# Patient Record
Sex: FEMALE | Race: OTHER | NOT HISPANIC OR LATINO
[De-identification: names, ages, dates, MRNs, and addresses within clinical notes are randomized per-mention and may not be internally consistent; named-entity substitution may affect disease eponyms.]

---

## 2021-10-28 ENCOUNTER — APPOINTMENT (OUTPATIENT)
Dept: ORTHOPEDIC SURGERY | Facility: CLINIC | Age: 51
End: 2021-10-28

## 2022-01-11 ENCOUNTER — TRANSCRIPTION ENCOUNTER (OUTPATIENT)
Age: 52
End: 2022-01-11

## 2022-01-11 PROBLEM — Z00.00 ENCOUNTER FOR PREVENTIVE HEALTH EXAMINATION: Status: ACTIVE | Noted: 2022-01-11

## 2022-01-11 NOTE — PATIENT PROFILE ADULT - FALL HARM RISK - UNIVERSAL INTERVENTIONS
Bed in lowest position, wheels locked, appropriate side rails in place/Call bell, personal items and telephone in reach/Instruct patient to call for assistance before getting out of bed or chair/Non-slip footwear when patient is out of bed/[place] to call system/Physically safe environment - no spills, clutter or unnecessary equipment/Purposeful Proactive Rounding/Room/bathroom lighting operational, light cord in reach

## 2022-01-12 ENCOUNTER — RESULT REVIEW (OUTPATIENT)
Age: 52
End: 2022-01-12

## 2022-01-12 ENCOUNTER — INPATIENT (INPATIENT)
Facility: HOSPITAL | Age: 52
LOS: 1 days | Discharge: HOME | DRG: 743 | End: 2022-01-14
Attending: OBSTETRICS & GYNECOLOGY | Admitting: OBSTETRICS & GYNECOLOGY
Payer: COMMERCIAL

## 2022-01-12 VITALS
RESPIRATION RATE: 16 BRPM | TEMPERATURE: 98 F | WEIGHT: 156.53 LBS | SYSTOLIC BLOOD PRESSURE: 118 MMHG | OXYGEN SATURATION: 100 % | HEIGHT: 62 IN | HEART RATE: 80 BPM | DIASTOLIC BLOOD PRESSURE: 81 MMHG

## 2022-01-12 DIAGNOSIS — Z98.890 OTHER SPECIFIED POSTPROCEDURAL STATES: Chronic | ICD-10-CM

## 2022-01-12 DIAGNOSIS — Z98.82 BREAST IMPLANT STATUS: Chronic | ICD-10-CM

## 2022-01-12 DIAGNOSIS — Z41.9 ENCOUNTER FOR PROCEDURE FOR PURPOSES OTHER THAN REMEDYING HEALTH STATE, UNSPECIFIED: Chronic | ICD-10-CM

## 2022-01-12 LAB
BLD GP AB SCN SERPL QL: NEGATIVE — SIGNIFICANT CHANGE UP
GLUCOSE BLDC GLUCOMTR-MCNC: 74 MG/DL — SIGNIFICANT CHANGE UP (ref 70–99)
HCT VFR BLD CALC: 39.1 % — SIGNIFICANT CHANGE UP (ref 34.5–45)
HGB BLD-MCNC: 11.8 G/DL — SIGNIFICANT CHANGE UP (ref 11.5–15.5)
MCHC RBC-ENTMCNC: 25.8 PG — LOW (ref 27–34)
MCHC RBC-ENTMCNC: 30.2 GM/DL — LOW (ref 32–36)
MCV RBC AUTO: 85.4 FL — SIGNIFICANT CHANGE UP (ref 80–100)
NRBC # BLD: 0 /100 WBCS — SIGNIFICANT CHANGE UP (ref 0–0)
PLATELET # BLD AUTO: 252 K/UL — SIGNIFICANT CHANGE UP (ref 150–400)
RBC # BLD: 4.58 M/UL — SIGNIFICANT CHANGE UP (ref 3.8–5.2)
RBC # FLD: 19.1 % — HIGH (ref 10.3–14.5)
RH IG SCN BLD-IMP: POSITIVE — SIGNIFICANT CHANGE UP
WBC # BLD: 17.98 K/UL — HIGH (ref 3.8–10.5)
WBC # FLD AUTO: 17.98 K/UL — HIGH (ref 3.8–10.5)

## 2022-01-12 PROCEDURE — 88307 TISSUE EXAM BY PATHOLOGIST: CPT | Mod: 26

## 2022-01-12 PROCEDURE — 88305 TISSUE EXAM BY PATHOLOGIST: CPT | Mod: 26

## 2022-01-12 PROCEDURE — 88302 TISSUE EXAM BY PATHOLOGIST: CPT | Mod: 26

## 2022-01-12 DEVICE — URETERAL CATH OPEN END 5FR 70CM
Type: IMPLANTABLE DEVICE | Status: NON-FUNCTIONAL
Removed: 2022-01-12

## 2022-01-12 DEVICE — GUIDEWIRE SENSOR DUAL-FLEX NITINOL STRAIGHT .035" X 150CM
Type: IMPLANTABLE DEVICE | Status: NON-FUNCTIONAL
Removed: 2022-01-12

## 2022-01-12 DEVICE — SURGICEL POWDER 3 GRAMS
Type: IMPLANTABLE DEVICE | Status: NON-FUNCTIONAL
Removed: 2022-01-12

## 2022-01-12 RX ORDER — SODIUM CHLORIDE 9 MG/ML
1000 INJECTION, SOLUTION INTRAVENOUS
Refills: 0 | Status: DISCONTINUED | OUTPATIENT
Start: 2022-01-12 | End: 2022-01-13

## 2022-01-12 RX ORDER — CELECOXIB 200 MG/1
400 CAPSULE ORAL ONCE
Refills: 0 | Status: COMPLETED | OUTPATIENT
Start: 2022-01-12 | End: 2022-01-12

## 2022-01-12 RX ORDER — ACETAMINOPHEN 500 MG
1000 TABLET ORAL EVERY 6 HOURS
Refills: 0 | Status: DISCONTINUED | OUTPATIENT
Start: 2022-01-12 | End: 2022-01-14

## 2022-01-12 RX ORDER — ONDANSETRON 8 MG/1
8 TABLET, FILM COATED ORAL EVERY 8 HOURS
Refills: 0 | Status: DISCONTINUED | OUTPATIENT
Start: 2022-01-12 | End: 2022-01-14

## 2022-01-12 RX ORDER — HEPARIN SODIUM 5000 [USP'U]/ML
5000 INJECTION INTRAVENOUS; SUBCUTANEOUS ONCE
Refills: 0 | Status: COMPLETED | OUTPATIENT
Start: 2022-01-12 | End: 2022-01-12

## 2022-01-12 RX ORDER — ACETAMINOPHEN 500 MG
1000 TABLET ORAL ONCE
Refills: 0 | Status: COMPLETED | OUTPATIENT
Start: 2022-01-12 | End: 2022-01-12

## 2022-01-12 RX ORDER — HYDROMORPHONE HYDROCHLORIDE 2 MG/ML
0.5 INJECTION INTRAMUSCULAR; INTRAVENOUS; SUBCUTANEOUS
Refills: 0 | Status: DISCONTINUED | OUTPATIENT
Start: 2022-01-12 | End: 2022-01-14

## 2022-01-12 RX ORDER — SIMETHICONE 80 MG/1
80 TABLET, CHEWABLE ORAL EVERY 6 HOURS
Refills: 0 | Status: DISCONTINUED | OUTPATIENT
Start: 2022-01-12 | End: 2022-01-14

## 2022-01-12 RX ORDER — OXYCODONE HYDROCHLORIDE 5 MG/1
5 TABLET ORAL EVERY 4 HOURS
Refills: 0 | Status: DISCONTINUED | OUTPATIENT
Start: 2022-01-12 | End: 2022-01-14

## 2022-01-12 RX ORDER — GABAPENTIN 400 MG/1
600 CAPSULE ORAL ONCE
Refills: 0 | Status: COMPLETED | OUTPATIENT
Start: 2022-01-12 | End: 2022-01-12

## 2022-01-12 RX ORDER — OXYCODONE HYDROCHLORIDE 5 MG/1
10 TABLET ORAL EVERY 6 HOURS
Refills: 0 | Status: DISCONTINUED | OUTPATIENT
Start: 2022-01-12 | End: 2022-01-14

## 2022-01-12 RX ORDER — BUPIVACAINE 13.3 MG/ML
20 INJECTION, SUSPENSION, LIPOSOMAL INFILTRATION ONCE
Refills: 0 | Status: DISCONTINUED | OUTPATIENT
Start: 2022-01-12 | End: 2022-01-14

## 2022-01-12 RX ADMIN — SIMETHICONE 80 MILLIGRAM(S): 80 TABLET, CHEWABLE ORAL at 18:39

## 2022-01-12 RX ADMIN — HYDROMORPHONE HYDROCHLORIDE 0.5 MILLIGRAM(S): 2 INJECTION INTRAMUSCULAR; INTRAVENOUS; SUBCUTANEOUS at 17:25

## 2022-01-12 RX ADMIN — GABAPENTIN 600 MILLIGRAM(S): 400 CAPSULE ORAL at 11:09

## 2022-01-12 RX ADMIN — HYDROMORPHONE HYDROCHLORIDE 0.5 MILLIGRAM(S): 2 INJECTION INTRAMUSCULAR; INTRAVENOUS; SUBCUTANEOUS at 18:31

## 2022-01-12 RX ADMIN — SIMETHICONE 80 MILLIGRAM(S): 80 TABLET, CHEWABLE ORAL at 23:05

## 2022-01-12 RX ADMIN — HEPARIN SODIUM 5000 UNIT(S): 5000 INJECTION INTRAVENOUS; SUBCUTANEOUS at 11:09

## 2022-01-12 RX ADMIN — SODIUM CHLORIDE 125 MILLILITER(S): 9 INJECTION, SOLUTION INTRAVENOUS at 23:13

## 2022-01-12 RX ADMIN — Medication 1000 MILLIGRAM(S): at 21:47

## 2022-01-12 RX ADMIN — Medication 1000 MILLIGRAM(S): at 20:42

## 2022-01-12 RX ADMIN — Medication 1000 MILLIGRAM(S): at 11:08

## 2022-01-12 RX ADMIN — CELECOXIB 400 MILLIGRAM(S): 200 CAPSULE ORAL at 11:08

## 2022-01-12 RX ADMIN — HYDROMORPHONE HYDROCHLORIDE 0.5 MILLIGRAM(S): 2 INJECTION INTRAMUSCULAR; INTRAVENOUS; SUBCUTANEOUS at 17:00

## 2022-01-12 NOTE — H&P ADULT - ASSESSMENT
51 year old P2 who presents for scheduled abdominal hysterectomy for large fibroid uterus.     ERAS protocol  NPO, IVF  Consents preop  Starting Hb 12.8, Cr 0.61  Patient agreeable to [facility]    d/w Dr. Jolanta ROONEY PGY3

## 2022-01-12 NOTE — H&P ADULT - NSHPPHYSICALEXAM_GEN_ALL_CORE
Vital Signs Last 24 Hrs  T(C): 36.7 (12 Jan 2022 10:37), Max: 36.7 (12 Jan 2022 10:37)  T(F): 98.1 (12 Jan 2022 10:37), Max: 98.1 (12 Jan 2022 10:37)  HR: 80 (12 Jan 2022 10:37) (80 - 80)  BP: 118/81 (12 Jan 2022 10:37) (118/81 - 118/81)  RR: 16 (12 Jan 2022 10:37) (16 - 16)  SpO2: 100% (12 Jan 2022 10:37) (100% - 100%)

## 2022-01-12 NOTE — PACU DISCHARGE NOTE - COMMENTS
Report given to Alice RN Dressing to abdomen Dry and intact. Vidal intact, iv site intact, transported to room in bed with O 2 @2LNC.

## 2022-01-12 NOTE — BRIEF OPERATIVE NOTE - OPERATION/FINDINGS
18 week sized mobile uterus on exam under anesthesia. Entry via Pfannenstiel. Extensive lysis of adhesions from anterior abdominal wall to uterus, bladder to anterior uterus. Enlarged fibroid uterus, 18 weeks in size. Bilateral tubal ligation noted, normal appearing remainder of tubes and ovaries bilaterally. 2cm hemorrhagic cyst removed from left ovary, hemostasis achieved with bipolar. Decision made to proceed with supracervical hysterectomy & bilateral salpingectomy, hemostasis achieved with bipolar and Ligasure Impact. Endocervical canal cauterized with bipolar. Cervical stump closed with figure of eight 0 vicryl, hemostasis achieved. Surgicel powder placed over stump and oozy peritoneal beds. Fascia closed with 0 vicryl. Subcutaneous tissue hemostatic and closed with 2.0 vicryl. Skin closed with monocryl. Count correct, patient cleaned and stable.

## 2022-01-12 NOTE — H&P ADULT - HISTORY OF PRESENT ILLNESS
MARI SANTANA  51y  Female [id number]    HPI: Patient is a 51 year old P2, LMP 12/24/21 who presents for scheduled total abdominal hysterectomy for 20 week fibroid uterus causing abnormal uterine bleeding. Patient follows with Dr. Stover ([facility]); no acute complaints today.     POB:  c/s x2 ('97, '00)    PGynHx: AUB, fibroid uterus    PMH: denies    PSH: c/s x2; abdominoplasty 2012; breast augmentation 2012     Meds: denies    All: No Known Allergies    Soc: neg x 3      ROS: neg except as noted in HPI                                   MARI SANTANA  51y  Female [id number]    HPI: Patient is a 51 year old P2, LMP 12/24/21 who presents for scheduled total abdominal hysterectomy for large fibroid uterus causing abnormal uterine bleeding. Patient follows with Dr. Stover ([facility]); no acute complaints today.     POB:  c/s x2 ('97, '00)    PGynHx: AUB, fibroid uterus    PMH: denies    PSH: c/s x2; abdominoplasty 2012; breast augmentation 2012     Meds: denies    All: No Known Allergies    Soc: neg x 3      ROS: neg except as noted in HPI

## 2022-01-12 NOTE — BRIEF OPERATIVE NOTE - NSICDXBRIEFPROCEDURE_GEN_ALL_CORE_FT
PROCEDURES:  Open supracervical hysterectomy 12-Jan-2022 16:42:11  Juve Hi  Salpingectomy, bilateral 12-Jan-2022 16:42:30  Juve Hi  Lysis of pelvic adhesions 12-Jan-2022 16:43:07  Juve Hi  Open ovarian cystectomy 12-Jan-2022 16:43:19  Juve Hi

## 2022-01-13 ENCOUNTER — TRANSCRIPTION ENCOUNTER (OUTPATIENT)
Age: 52
End: 2022-01-13

## 2022-01-13 LAB
HCT VFR BLD CALC: 35 % — SIGNIFICANT CHANGE UP (ref 34.5–45)
HGB BLD-MCNC: 11.1 G/DL — LOW (ref 11.5–15.5)
MCHC RBC-ENTMCNC: 27.1 PG — SIGNIFICANT CHANGE UP (ref 27–34)
MCHC RBC-ENTMCNC: 31.7 GM/DL — LOW (ref 32–36)
MCV RBC AUTO: 85.6 FL — SIGNIFICANT CHANGE UP (ref 80–100)
NRBC # BLD: 0 /100 WBCS — SIGNIFICANT CHANGE UP (ref 0–0)
PLATELET # BLD AUTO: 242 K/UL — SIGNIFICANT CHANGE UP (ref 150–400)
RBC # BLD: 4.09 M/UL — SIGNIFICANT CHANGE UP (ref 3.8–5.2)
RBC # FLD: 18.6 % — HIGH (ref 10.3–14.5)
WBC # BLD: 15.73 K/UL — HIGH (ref 3.8–10.5)
WBC # FLD AUTO: 15.73 K/UL — HIGH (ref 3.8–10.5)

## 2022-01-13 RX ORDER — ENOXAPARIN SODIUM 100 MG/ML
40 INJECTION SUBCUTANEOUS EVERY 24 HOURS
Refills: 0 | Status: DISCONTINUED | OUTPATIENT
Start: 2022-01-13 | End: 2022-01-14

## 2022-01-13 RX ORDER — KETOROLAC TROMETHAMINE 30 MG/ML
30 SYRINGE (ML) INJECTION EVERY 6 HOURS
Refills: 0 | Status: DISCONTINUED | OUTPATIENT
Start: 2022-01-13 | End: 2022-01-14

## 2022-01-13 RX ADMIN — SIMETHICONE 80 MILLIGRAM(S): 80 TABLET, CHEWABLE ORAL at 17:33

## 2022-01-13 RX ADMIN — Medication 30 MILLIGRAM(S): at 09:38

## 2022-01-13 RX ADMIN — Medication 30 MILLIGRAM(S): at 16:18

## 2022-01-13 RX ADMIN — Medication 1000 MILLIGRAM(S): at 07:22

## 2022-01-13 RX ADMIN — Medication 1000 MILLIGRAM(S): at 19:18

## 2022-01-13 RX ADMIN — Medication 1000 MILLIGRAM(S): at 14:51

## 2022-01-13 RX ADMIN — Medication 1000 MILLIGRAM(S): at 06:21

## 2022-01-13 RX ADMIN — ENOXAPARIN SODIUM 40 MILLIGRAM(S): 100 INJECTION SUBCUTANEOUS at 09:12

## 2022-01-13 RX ADMIN — Medication 1000 MILLIGRAM(S): at 19:49

## 2022-01-13 RX ADMIN — Medication 30 MILLIGRAM(S): at 15:55

## 2022-01-13 RX ADMIN — Medication 1000 MILLIGRAM(S): at 01:00

## 2022-01-13 RX ADMIN — Medication 1000 MILLIGRAM(S): at 02:04

## 2022-01-13 RX ADMIN — SIMETHICONE 80 MILLIGRAM(S): 80 TABLET, CHEWABLE ORAL at 23:58

## 2022-01-13 RX ADMIN — SIMETHICONE 80 MILLIGRAM(S): 80 TABLET, CHEWABLE ORAL at 05:45

## 2022-01-13 RX ADMIN — Medication 30 MILLIGRAM(S): at 22:48

## 2022-01-13 RX ADMIN — SIMETHICONE 80 MILLIGRAM(S): 80 TABLET, CHEWABLE ORAL at 11:17

## 2022-01-13 RX ADMIN — Medication 30 MILLIGRAM(S): at 09:13

## 2022-01-13 RX ADMIN — SODIUM CHLORIDE 125 MILLILITER(S): 9 INJECTION, SOLUTION INTRAVENOUS at 06:21

## 2022-01-13 RX ADMIN — Medication 1000 MILLIGRAM(S): at 13:51

## 2022-01-13 RX ADMIN — Medication 30 MILLIGRAM(S): at 21:18

## 2022-01-13 NOTE — DISCHARGE NOTE PROVIDER - HOSPITAL COURSE
Patient is a 51 year old who presented for and underwent an abdominal supracervical hysterectomy, bilateral salpingectomy and left ovarian cystectomy without complication, . POD1 Hb was appropriate for EBL. Vidal was removed and patient was able to void; pain was controlled and she tolerated  a regular diet. On POD2 patient's vitals were stable, she continued to meet all postoperative milestones and was discharged in stable condition.

## 2022-01-13 NOTE — DISCHARGE NOTE PROVIDER - NSDCMRMEDTOKEN_GEN_ALL_CORE_FT
acetaminophen 500 mg oral tablet: 2 tab(s) orally every 6 hours  ibuprofen 600 mg oral tablet: 1 tab(s) orally every 6 hours

## 2022-01-13 NOTE — PROGRESS NOTE ADULT - ATTENDING COMMENTS
pt seen and examined  +flatus ambulation urination  labs reviewed  gen awake alert nad  ab nt nd +bs soft  ext nt  a/p  pod 1 doing well  continue post op care anticipate dc in am

## 2022-01-13 NOTE — DISCHARGE NOTE PROVIDER - NSDCFUADDINST_GEN_ALL_CORE_FT
- Nothing in vagina - no intercourse, tampons, or douching until cleared by your doctor.   - Clean incision by letting warm soapy water run over it during showering. Pat dry with a towel.   - Avoid swimming, tub baths, strenuous activity and heavy lifting until cleared by your doctor.   - Showering is ok.   - Continue oral pain medications as needed for pain.    - Follow up in office in 2 weeks for your postoperative visit.  Call the office to confirm your follow up appointment.   - Call the office sooner if you develop any fever, heavy bleeding, or severe pain.  Go to the closest emergency room for any of these symptoms if you are not able to contact your doctor.

## 2022-01-13 NOTE — DISCHARGE NOTE PROVIDER - CARE PROVIDER_API CALL
Nikki Stover (MD)  Obstetrics and Gynecology  [address], [place] [address]  Phone: [phone]  Fax: [phone]  Follow Up Time:

## 2022-01-14 ENCOUNTER — TRANSCRIPTION ENCOUNTER (OUTPATIENT)
Age: 52
End: 2022-01-14

## 2022-01-14 VITALS
OXYGEN SATURATION: 97 % | RESPIRATION RATE: 16 BRPM | DIASTOLIC BLOOD PRESSURE: 60 MMHG | SYSTOLIC BLOOD PRESSURE: 95 MMHG | TEMPERATURE: 98 F | HEART RATE: 72 BPM

## 2022-01-14 RX ORDER — ACETAMINOPHEN 500 MG
2 TABLET ORAL
Qty: 0 | Refills: 0 | DISCHARGE
Start: 2022-01-14

## 2022-01-14 RX ORDER — IBUPROFEN 200 MG
1 TABLET ORAL
Qty: 0 | Refills: 0 | DISCHARGE
Start: 2022-01-14

## 2022-01-14 RX ORDER — IBUPROFEN 200 MG
600 TABLET ORAL EVERY 6 HOURS
Refills: 0 | Status: DISCONTINUED | OUTPATIENT
Start: 2022-01-14 | End: 2022-01-14

## 2022-01-14 RX ADMIN — Medication 30 MILLIGRAM(S): at 03:26

## 2022-01-14 RX ADMIN — Medication 1000 MILLIGRAM(S): at 13:31

## 2022-01-14 RX ADMIN — Medication 1000 MILLIGRAM(S): at 07:06

## 2022-01-14 RX ADMIN — Medication 1000 MILLIGRAM(S): at 02:00

## 2022-01-14 RX ADMIN — Medication 600 MILLIGRAM(S): at 12:28

## 2022-01-14 RX ADMIN — ENOXAPARIN SODIUM 40 MILLIGRAM(S): 100 INJECTION SUBCUTANEOUS at 07:06

## 2022-01-14 RX ADMIN — Medication 30 MILLIGRAM(S): at 03:56

## 2022-01-14 RX ADMIN — Medication 600 MILLIGRAM(S): at 16:26

## 2022-01-14 RX ADMIN — Medication 1000 MILLIGRAM(S): at 01:01

## 2022-01-14 RX ADMIN — Medication 600 MILLIGRAM(S): at 11:01

## 2022-01-14 RX ADMIN — Medication 600 MILLIGRAM(S): at 17:06

## 2022-01-14 RX ADMIN — OXYCODONE HYDROCHLORIDE 10 MILLIGRAM(S): 5 TABLET ORAL at 07:47

## 2022-01-14 RX ADMIN — SIMETHICONE 80 MILLIGRAM(S): 80 TABLET, CHEWABLE ORAL at 11:01

## 2022-01-14 RX ADMIN — Medication 1000 MILLIGRAM(S): at 07:47

## 2022-01-14 RX ADMIN — SIMETHICONE 80 MILLIGRAM(S): 80 TABLET, CHEWABLE ORAL at 06:09

## 2022-01-14 RX ADMIN — OXYCODONE HYDROCHLORIDE 10 MILLIGRAM(S): 5 TABLET ORAL at 07:06

## 2022-01-14 RX ADMIN — Medication 1000 MILLIGRAM(S): at 12:23

## 2022-01-14 NOTE — DISCHARGE NOTE NURSING/CASE MANAGEMENT/SOCIAL WORK - NSDCPEFALRISK_GEN_ALL_CORE
For information on Fall & Injury Prevention, visit: [email or web address] OR  [email or web address] OR  https://www.cdc.gov/steadi/patient.html

## 2022-01-14 NOTE — DISCHARGE NOTE NURSING/CASE MANAGEMENT/SOCIAL WORK - PATIENT PORTAL LINK FT
You can access the FollowMyHealth Patient Portal offered by [facility] by registering at the following website: [email or web address]. By joining [employer]’s FollowMyHealth portal, you will also be able to view your health information using other applications (apps) compatible with our system.

## 2022-01-14 NOTE — PROGRESS NOTE ADULT - ASSESSMENT
A/P: 51y POD#2  s/p abd Supracervical Hysterectomy, BS, left ovarian cystectomy,  without complication. Patient is stable and doing well.  Patient is meeting all post op milestones.      Neuro: Pain well controlled, continue regimen   CV: hemodynamically stable, monitor vitals  Pulm: saturating well on room air, encourage oob/amb  GI: LFD; IV Heplock   : voiding   Heme: PACU Hb 11.8, POD#1 Hb 11.1  ID: afebrile  Dispo: discharge planning    Juve Hi PGY3  
Patient is a 51 year old P2, s/p  Abd SHUAN, BS, left ovarian cystectomy, TAP block, HUGO for 18 week sized fibroid uterus  and AUB. .    51y Female POD# 0 s/p                                      1. Neuro/Pain:  tylenol/toradol atc, oxycodone PRN  2  CV:  VS per routine  3. Pulm: Encourage ISS  4. GI: AAT  5. :  Vidal in place, TOV in AM  6. Heme: AM CBC  7. ID: --  8. DVT ppx: SCDs  9. Dispo: continue inpatient management
A/P: 51y POD#1 s/p abd Supracervical Hysterectomy, BS, left ovarian cystectomy,  without complication. Patient is stable and doing well.  Patient is meeting all post op milestones.      Neuro: Pain well controlled, continue regimen   CV: hemodynamically stable, monitor vitals  Pulm: saturating well on room air, encourage oob/amb  GI: LFD; heplock IV   : d/c rangel; patient to have TOV   Heme: PACU Hb 11.8, POD#1 Hb 11.1   ID: afebrile  Dispo: continue routine post-op care    Juve Hi PGY3

## 2022-01-14 NOTE — PROGRESS NOTE ADULT - SUBJECTIVE AND OBJECTIVE BOX
GYN Progress Note  POD#1    Patient seen and examined at bedside.  No acute events overnight. No acute complaints.  Pain well controlled.  Has not ambulated yet; tolerating clears.   Has not yet passed flatus.   Vidal is still in place with clear urine.   Denies CP, SOB, N/V, fevers, and chills.    Vital Signs Last 24 Hours  T(C): 37.2 (01-13-22 @ 05:24), Max: 37.2 (01-13-22 @ 05:24)  HR: 81 (01-13-22 @ 05:24) (65 - 86)  BP: 96/60 (01-13-22 @ 05:24) (96/60 - 119/80)  RR: 18 (01-13-22 @ 05:24) (10 - 22)  SpO2: 97% (01-13-22 @ 05:24) (96% - 100%)    I&O's Summary    12 Jan 2022 07:01  -  13 Jan 2022 07:00  --------------------------------------------------------  IN: 1815 mL / OUT: 1785 mL / NET: 30 mL        Physical Exam:  General: NAD  CV: RR, S1, S2  Lungs: CTA b/l, good air flow b/l   Abdomen: Soft, appropriately tender to palpation, softly distended, present bowel sounds in all 4 quadrants   Incision: c/d/i, steris in place, dressing removed   : no bleeding on pad  Ext: warm and well perfused    Labs:                        11.1   15.73 )-----------( 242      ( 13 Jan 2022 05:49 )             35.0   baso x      eos x      imm gran x      lymph x      mono x      poly x                            11.8   17.98 )-----------( 252      ( 12 Jan 2022 17:00 )             39.1   baso x      eos x      imm gran x      lymph x      mono x      poly x          MEDICATIONS  (STANDING):  acetaminophen     Tablet .. 1000 milliGRAM(s) Oral every 6 hours  BUpivacaine liposome 1.3% Injectable (no eMAR) 20 milliLiter(s) Local Injection once  enoxaparin Injectable 40 milliGRAM(s) SubCutaneous every 24 hours  ketorolac   Injectable 30 milliGRAM(s) IV Push every 6 hours  simethicone 80 milliGRAM(s) Chew every 6 hours    MEDICATIONS  (PRN):  HYDROmorphone  Injectable 0.5 milliGRAM(s) IV Push every 15 minutes PRN Severe Pain (7 - 10)  ondansetron Injectable 8 milliGRAM(s) IV Push every 8 hours PRN Nausea and/or Vomiting  oxyCODONE    IR 5 milliGRAM(s) Oral every 4 hours PRN Moderate Pain (4 - 6)  oxyCODONE    IR 10 milliGRAM(s) Oral every 6 hours PRN Severe Pain (7 - 10)      
GYN POST-OPERATIVE CHECK  Patient seen at bedside.  Pain controlled. Tolerating sips.  No OOB yet.  Vidal in place. No flatus yet.    Denies CP, palpitations, SOB, fever, chills, nausea, vomiting.    Vital Signs Last 24 Hrs  T(C): 36.8 (12 Jan 2022 20:48), Max: 36.9 (12 Jan 2022 19:19)  T(F): 98.2 (12 Jan 2022 20:48), Max: 98.4 (12 Jan 2022 19:19)  HR: 72 (12 Jan 2022 20:48) (65 - 86)  BP: 109/76 (12 Jan 2022 20:48) (99/80 - 119/80)  BP(mean): 84 (12 Jan 2022 18:40) (80 - 90)  RR: 18 (12 Jan 2022 20:48) (10 - 22)  SpO2: 98% (12 Jan 2022 20:48) (98% - 100%)    Physical Exam:  Gen: No Acute Distress  Pulm: Clear to auscultation bilaterally  GI: soft, appropriately tender, non-distended, normoactive BS, no rebound, no guarding.  Dressing C/D/I  : Vidal in place  Ext: SCDs in place, no edema/erythema/tenderness    I&O's Summary    12 Jan 2022 07:01  -  12 Jan 2022 21:10  --------------------------------------------------------  IN: 315 mL / OUT: 235 mL / NET: 80 mL      MEDICATIONS  (STANDING):  acetaminophen     Tablet .. 1000 milliGRAM(s) Oral every 6 hours  BUpivacaine liposome 1.3% Injectable (no eMAR) 20 milliLiter(s) Local Injection once  lactated ringers. 1000 milliLiter(s) (125 mL/Hr) IV Continuous <Continuous>  simethicone 80 milliGRAM(s) Chew every 6 hours    MEDICATIONS  (PRN):  HYDROmorphone  Injectable 0.5 milliGRAM(s) IV Push every 15 minutes PRN Severe Pain (7 - 10)  ondansetron Injectable 8 milliGRAM(s) IV Push every 8 hours PRN Nausea and/or Vomiting  oxyCODONE    IR 5 milliGRAM(s) Oral every 4 hours PRN Moderate Pain (4 - 6)  oxyCODONE    IR 10 milliGRAM(s) Oral every 6 hours PRN Severe Pain (7 - 10)    Allergies    No Known Allergies    Intolerances        LABS:                        11.8   17.98 )-----------( 252      ( 12 Jan 2022 17:00 )             39.1                   
GYN Progress Note  POD#2   HD#3    Patient seen and examined at bedside.  No acute events overnight. No acute complaints.  Pain well controlled.  Patient is ambulating and tolerating regular diet.   Patient is passing flatus.    Patient is voiding spontaneously .  Denies CP, SOB, N/V, fevers, and chills, heavy vaginal bleeding.     Vital Signs Last 24 Hours  T(C): 36.7 (01-14-22 @ 05:12), Max: 36.9 (01-13-22 @ 20:51)  HR: 69 (01-14-22 @ 05:12) (63 - 96)  BP: 102/70 (01-14-22 @ 05:12) (94/58 - 107/72)  RR: 17 (01-14-22 @ 05:12) (17 - 18)  SpO2: 96% (01-14-22 @ 05:12) (96% - 97%)    I&O's Summary    13 Jan 2022 07:01  -  14 Jan 2022 07:00  --------------------------------------------------------  IN: 1140 mL / OUT: 1700 mL / NET: -560 mL        Physical Exam:  General: NAD  CV: RR, S1, S2  Lungs: CTA b/l, good air flow b/l   Abdomen: Soft, appropriately tender to palpation, non distended  Incision: incision c/d/i, steris in place  : no bleeding on pad  Ext: warm and well perfused    Labs:                        11.1   15.73 )-----------( 242      ( 13 Jan 2022 05:49 )             35.0   baso x      eos x      imm gran x      lymph x      mono x      poly x                            11.8   17.98 )-----------( 252      ( 12 Jan 2022 17:00 )             39.1   baso x      eos x      imm gran x      lymph x      mono x      poly x          MEDICATIONS  (STANDING):  acetaminophen     Tablet .. 1000 milliGRAM(s) Oral every 6 hours  BUpivacaine liposome 1.3% Injectable (no eMAR) 20 milliLiter(s) Local Injection once  enoxaparin Injectable 40 milliGRAM(s) SubCutaneous every 24 hours  ibuprofen  Tablet. 600 milliGRAM(s) Oral every 6 hours  simethicone 80 milliGRAM(s) Chew every 6 hours    MEDICATIONS  (PRN):  HYDROmorphone  Injectable 0.5 milliGRAM(s) IV Push every 15 minutes PRN Severe Pain (7 - 10)  ondansetron Injectable 8 milliGRAM(s) IV Push every 8 hours PRN Nausea and/or Vomiting  oxyCODONE    IR 5 milliGRAM(s) Oral every 4 hours PRN Moderate Pain (4 - 6)  oxyCODONE    IR 10 milliGRAM(s) Oral every 6 hours PRN Severe Pain (7 - 10)

## 2022-01-19 LAB — SURGICAL PATHOLOGY STUDY: SIGNIFICANT CHANGE UP

## 2022-01-20 DIAGNOSIS — D25.9 LEIOMYOMA OF UTERUS, UNSPECIFIED: ICD-10-CM

## 2022-01-20 DIAGNOSIS — N83.202 UNSPECIFIED OVARIAN CYST, LEFT SIDE: ICD-10-CM

## 2022-01-27 ENCOUNTER — INPATIENT (INPATIENT)
Facility: HOSPITAL | Age: 52
LOS: 3 days | Discharge: HOME | DRG: 863 | End: 2022-01-31
Attending: OBSTETRICS & GYNECOLOGY | Admitting: OBSTETRICS & GYNECOLOGY
Payer: COMMERCIAL

## 2022-01-27 VITALS
RESPIRATION RATE: 18 BRPM | DIASTOLIC BLOOD PRESSURE: 72 MMHG | WEIGHT: 151.9 LBS | SYSTOLIC BLOOD PRESSURE: 109 MMHG | TEMPERATURE: 99 F | HEART RATE: 105 BPM | HEIGHT: 62 IN | OXYGEN SATURATION: 98 %

## 2022-01-27 DIAGNOSIS — Z41.9 ENCOUNTER FOR PROCEDURE FOR PURPOSES OTHER THAN REMEDYING HEALTH STATE, UNSPECIFIED: Chronic | ICD-10-CM

## 2022-01-27 DIAGNOSIS — Z98.82 BREAST IMPLANT STATUS: Chronic | ICD-10-CM

## 2022-01-27 DIAGNOSIS — Z98.890 OTHER SPECIFIED POSTPROCEDURAL STATES: Chronic | ICD-10-CM

## 2022-01-27 LAB
ALBUMIN SERPL ELPH-MCNC: 4.2 G/DL — SIGNIFICANT CHANGE UP (ref 3.3–5)
ALP SERPL-CCNC: 63 U/L — SIGNIFICANT CHANGE UP (ref 40–120)
ALT FLD-CCNC: 15 U/L — SIGNIFICANT CHANGE UP (ref 10–45)
ANION GAP SERPL CALC-SCNC: 12 MMOL/L — SIGNIFICANT CHANGE UP (ref 5–17)
APPEARANCE UR: CLEAR — SIGNIFICANT CHANGE UP
AST SERPL-CCNC: 15 U/L — SIGNIFICANT CHANGE UP (ref 10–40)
BACTERIA # UR AUTO: PRESENT /HPF
BASOPHILS # BLD AUTO: 0.07 K/UL — SIGNIFICANT CHANGE UP (ref 0–0.2)
BASOPHILS NFR BLD AUTO: 0.4 % — SIGNIFICANT CHANGE UP (ref 0–2)
BILIRUB SERPL-MCNC: 0.6 MG/DL — SIGNIFICANT CHANGE UP (ref 0.2–1.2)
BILIRUB UR-MCNC: NEGATIVE — SIGNIFICANT CHANGE UP
BUN SERPL-MCNC: 12 MG/DL — SIGNIFICANT CHANGE UP (ref 7–23)
CALCIUM SERPL-MCNC: 10 MG/DL — SIGNIFICANT CHANGE UP (ref 8.4–10.5)
CHLORIDE SERPL-SCNC: 103 MMOL/L — SIGNIFICANT CHANGE UP (ref 96–108)
CO2 SERPL-SCNC: 25 MMOL/L — SIGNIFICANT CHANGE UP (ref 22–31)
COLOR SPEC: YELLOW — SIGNIFICANT CHANGE UP
CREAT SERPL-MCNC: 0.73 MG/DL — SIGNIFICANT CHANGE UP (ref 0.5–1.3)
DIFF PNL FLD: ABNORMAL
EOSINOPHIL # BLD AUTO: 0.02 K/UL — SIGNIFICANT CHANGE UP (ref 0–0.5)
EOSINOPHIL NFR BLD AUTO: 0.1 % — SIGNIFICANT CHANGE UP (ref 0–6)
EPI CELLS # UR: SIGNIFICANT CHANGE UP /HPF (ref 0–5)
GLUCOSE SERPL-MCNC: 101 MG/DL — HIGH (ref 70–99)
GLUCOSE UR QL: NEGATIVE — SIGNIFICANT CHANGE UP
HCT VFR BLD CALC: 40.6 % — SIGNIFICANT CHANGE UP (ref 34.5–45)
HGB BLD-MCNC: 12.8 G/DL — SIGNIFICANT CHANGE UP (ref 11.5–15.5)
IMM GRANULOCYTES NFR BLD AUTO: 0.4 % — SIGNIFICANT CHANGE UP (ref 0–1.5)
KETONES UR-MCNC: ABNORMAL MG/DL
LACTATE SERPL-SCNC: 0.9 MMOL/L — SIGNIFICANT CHANGE UP (ref 0.5–2)
LEUKOCYTE ESTERASE UR-ACNC: NEGATIVE — SIGNIFICANT CHANGE UP
LYMPHOCYTES # BLD AUTO: 1.65 K/UL — SIGNIFICANT CHANGE UP (ref 1–3.3)
LYMPHOCYTES # BLD AUTO: 8.6 % — LOW (ref 13–44)
MCHC RBC-ENTMCNC: 27.5 PG — SIGNIFICANT CHANGE UP (ref 27–34)
MCHC RBC-ENTMCNC: 31.5 GM/DL — LOW (ref 32–36)
MCV RBC AUTO: 87.1 FL — SIGNIFICANT CHANGE UP (ref 80–100)
MONOCYTES # BLD AUTO: 0.93 K/UL — HIGH (ref 0–0.9)
MONOCYTES NFR BLD AUTO: 4.8 % — SIGNIFICANT CHANGE UP (ref 2–14)
NEUTROPHILS # BLD AUTO: 16.55 K/UL — HIGH (ref 1.8–7.4)
NEUTROPHILS NFR BLD AUTO: 85.7 % — HIGH (ref 43–77)
NITRITE UR-MCNC: NEGATIVE — SIGNIFICANT CHANGE UP
NRBC # BLD: 0 /100 WBCS — SIGNIFICANT CHANGE UP (ref 0–0)
PH UR: 6 — SIGNIFICANT CHANGE UP (ref 5–8)
PLATELET # BLD AUTO: 408 K/UL — HIGH (ref 150–400)
POTASSIUM SERPL-MCNC: 4.2 MMOL/L — SIGNIFICANT CHANGE UP (ref 3.5–5.3)
POTASSIUM SERPL-SCNC: 4.2 MMOL/L — SIGNIFICANT CHANGE UP (ref 3.5–5.3)
PROT SERPL-MCNC: 8 G/DL — SIGNIFICANT CHANGE UP (ref 6–8.3)
PROT UR-MCNC: ABNORMAL MG/DL
RBC # BLD: 4.66 M/UL — SIGNIFICANT CHANGE UP (ref 3.8–5.2)
RBC # FLD: 16.5 % — HIGH (ref 10.3–14.5)
RBC CASTS # UR COMP ASSIST: < 5 /HPF — SIGNIFICANT CHANGE UP
SARS-COV-2 RNA SPEC QL NAA+PROBE: SIGNIFICANT CHANGE UP
SODIUM SERPL-SCNC: 140 MMOL/L — SIGNIFICANT CHANGE UP (ref 135–145)
SP GR SPEC: 1.02 — SIGNIFICANT CHANGE UP (ref 1–1.03)
UROBILINOGEN FLD QL: 0.2 E.U./DL — SIGNIFICANT CHANGE UP
WBC # BLD: 19.29 K/UL — HIGH (ref 3.8–10.5)
WBC # FLD AUTO: 19.29 K/UL — HIGH (ref 3.8–10.5)
WBC UR QL: < 5 /HPF — SIGNIFICANT CHANGE UP

## 2022-01-27 PROCEDURE — 99285 EMERGENCY DEPT VISIT HI MDM: CPT

## 2022-01-27 PROCEDURE — 74177 CT ABD & PELVIS W/CONTRAST: CPT | Mod: 26,MC

## 2022-01-27 RX ORDER — SODIUM CHLORIDE 9 MG/ML
1000 INJECTION INTRAMUSCULAR; INTRAVENOUS; SUBCUTANEOUS ONCE
Refills: 0 | Status: COMPLETED | OUTPATIENT
Start: 2022-01-27 | End: 2022-01-27

## 2022-01-27 RX ORDER — PIPERACILLIN AND TAZOBACTAM 4; .5 G/20ML; G/20ML
3.38 INJECTION, POWDER, LYOPHILIZED, FOR SOLUTION INTRAVENOUS ONCE
Refills: 0 | Status: COMPLETED | OUTPATIENT
Start: 2022-01-27 | End: 2022-01-27

## 2022-01-27 RX ORDER — ACETAMINOPHEN 500 MG
1000 TABLET ORAL EVERY 6 HOURS
Refills: 0 | Status: DISCONTINUED | OUTPATIENT
Start: 2022-01-27 | End: 2022-01-31

## 2022-01-27 RX ORDER — KETOROLAC TROMETHAMINE 30 MG/ML
30 SYRINGE (ML) INJECTION ONCE
Refills: 0 | Status: DISCONTINUED | OUTPATIENT
Start: 2022-01-27 | End: 2022-01-27

## 2022-01-27 RX ORDER — ONDANSETRON 8 MG/1
8 TABLET, FILM COATED ORAL EVERY 8 HOURS
Refills: 0 | Status: DISCONTINUED | OUTPATIENT
Start: 2022-01-27 | End: 2022-01-28

## 2022-01-27 RX ORDER — PIPERACILLIN AND TAZOBACTAM 4; .5 G/20ML; G/20ML
3.38 INJECTION, POWDER, LYOPHILIZED, FOR SOLUTION INTRAVENOUS EVERY 6 HOURS
Refills: 0 | Status: DISCONTINUED | OUTPATIENT
Start: 2022-01-28 | End: 2022-01-31

## 2022-01-27 RX ORDER — ENOXAPARIN SODIUM 100 MG/ML
40 INJECTION SUBCUTANEOUS EVERY 24 HOURS
Refills: 0 | Status: DISCONTINUED | OUTPATIENT
Start: 2022-01-27 | End: 2022-01-31

## 2022-01-27 RX ADMIN — SODIUM CHLORIDE 1000 MILLILITER(S): 9 INJECTION INTRAMUSCULAR; INTRAVENOUS; SUBCUTANEOUS at 18:40

## 2022-01-27 RX ADMIN — PIPERACILLIN AND TAZOBACTAM 200 GRAM(S): 4; .5 INJECTION, POWDER, LYOPHILIZED, FOR SOLUTION INTRAVENOUS at 23:22

## 2022-01-27 RX ADMIN — Medication 30 MILLIGRAM(S): at 21:27

## 2022-01-27 RX ADMIN — SODIUM CHLORIDE 1000 MILLILITER(S): 9 INJECTION INTRAMUSCULAR; INTRAVENOUS; SUBCUTANEOUS at 20:37

## 2022-01-27 RX ADMIN — Medication 30 MILLIGRAM(S): at 20:37

## 2022-01-27 NOTE — ED ADULT NURSE NOTE - NSICDXPASTSURGICALHX_GEN_ALL_CORE_FT
PAST SURGICAL HISTORY:   delivery delivered x 2    Elective surgery right foot    H/O abdominoplasty     H/O bilateral breast implants

## 2022-01-27 NOTE — ED PROVIDER NOTE - OBJECTIVE STATEMENT
52 y/o F, recently with total abdominal hysterectomy and b/l salpingectomy, L ovarian cystectomy on Jan 12th. Patient here now as she has had a fever as high as 104 for the past day, took Tylenol, Motrin, today went down but feeling weak. No other symptoms. Dr Stover, her surgeon, told her to come here for evaluation. Vaccinated for COVID only x1 in Nov 2021. No SOB, cough, chest pain, dysuria, vomiting, diarrhea. Pain improving since surgery, no discharge, no vaginal bleeding.

## 2022-01-27 NOTE — ED ADULT NURSE REASSESSMENT NOTE - NS ED NURSE REASSESS COMMENT FT1
Patient a/oX3, c/o of headache, no dizziness, with episodes of fever.  Vital signs stable.  All labs sent.  Toradol 30mg IVP adminsitered for pain.  Disposition pending.

## 2022-01-27 NOTE — ED PROVIDER NOTE - CLINICAL SUMMARY MEDICAL DECISION MAKING FREE TEXT BOX
50 y/o F with recent abdominal hysterectomy, bl salpingectomy, L ovarian cystectomy, now w/ fever to 104 x1d, resolved with Tylenol and Motrin but feeling weak. Plan for sepsis labs due to fever and tachycardia, blood cultures, UA, consult GYN. 52 y/o F with recent abdominal hysterectomy, bl salpingectomy, L ovarian cystectomy, now w/ fever to 104 x1d, resolved with Tylenol and Motrin but feeling weak. Plan for sepsis labs due to fever and tachycardia, blood cultures, UA, consult GYN.    CT with + abd wall abscess - signed out to night team pending gyn dispo.

## 2022-01-27 NOTE — ED ADULT TRIAGE NOTE - CHIEF COMPLAINT QUOTE
Patient states had hysterectomy 2 weeks ago 1/12/2022, and last night had headache and today fever of 104o, took Tylenol at 2pm today.  Told by OB Dr. Bowers to go to ED.

## 2022-01-27 NOTE — ED ADULT NURSE NOTE - OBJECTIVE STATEMENT
Patient states had hysterectomy 2 weeks ago 1/12/2022 due to uterine fiboids, and last night had headache and today fever of 104o, took Tylenol at 2pm today.  Told by OB Dr. Bowers to go to ED.

## 2022-01-27 NOTE — ED PROVIDER NOTE - CONSTITUTIONAL, MLM
normal... Mildly weak appearing, awake, alert, oriented to person, place, time/situation and in no apparent distress.

## 2022-01-28 ENCOUNTER — TRANSCRIPTION ENCOUNTER (OUTPATIENT)
Age: 52
End: 2022-01-28

## 2022-01-28 PROBLEM — N92.0 EXCESSIVE AND FREQUENT MENSTRUATION WITH REGULAR CYCLE: Chronic | Status: ACTIVE | Noted: 2022-01-11

## 2022-01-28 PROBLEM — D64.9 ANEMIA, UNSPECIFIED: Chronic | Status: ACTIVE | Noted: 2022-01-11

## 2022-01-28 LAB
ANION GAP SERPL CALC-SCNC: 11 MMOL/L — SIGNIFICANT CHANGE UP (ref 5–17)
BASOPHILS # BLD AUTO: 0.07 K/UL — SIGNIFICANT CHANGE UP (ref 0–0.2)
BASOPHILS NFR BLD AUTO: 0.4 % — SIGNIFICANT CHANGE UP (ref 0–2)
BUN SERPL-MCNC: 11 MG/DL — SIGNIFICANT CHANGE UP (ref 7–23)
CALCIUM SERPL-MCNC: 9.6 MG/DL — SIGNIFICANT CHANGE UP (ref 8.4–10.5)
CHLORIDE SERPL-SCNC: 105 MMOL/L — SIGNIFICANT CHANGE UP (ref 96–108)
CO2 SERPL-SCNC: 22 MMOL/L — SIGNIFICANT CHANGE UP (ref 22–31)
CREAT SERPL-MCNC: 0.78 MG/DL — SIGNIFICANT CHANGE UP (ref 0.5–1.3)
EOSINOPHIL # BLD AUTO: 0.04 K/UL — SIGNIFICANT CHANGE UP (ref 0–0.5)
EOSINOPHIL NFR BLD AUTO: 0.2 % — SIGNIFICANT CHANGE UP (ref 0–6)
GLUCOSE SERPL-MCNC: 88 MG/DL — SIGNIFICANT CHANGE UP (ref 70–99)
HCT VFR BLD CALC: 39 % — SIGNIFICANT CHANGE UP (ref 34.5–45)
HGB BLD-MCNC: 11.8 G/DL — SIGNIFICANT CHANGE UP (ref 11.5–15.5)
IMM GRANULOCYTES NFR BLD AUTO: 0.7 % — SIGNIFICANT CHANGE UP (ref 0–1.5)
LDH SERPL L TO P-CCNC: 214 U/L — SIGNIFICANT CHANGE UP (ref 50–242)
LYMPHOCYTES # BLD AUTO: 1.61 K/UL — SIGNIFICANT CHANGE UP (ref 1–3.3)
LYMPHOCYTES # BLD AUTO: 9.2 % — LOW (ref 13–44)
MAGNESIUM SERPL-MCNC: 2 MG/DL — SIGNIFICANT CHANGE UP (ref 1.6–2.6)
MCHC RBC-ENTMCNC: 26.6 PG — LOW (ref 27–34)
MCHC RBC-ENTMCNC: 30.3 GM/DL — LOW (ref 32–36)
MCV RBC AUTO: 88 FL — SIGNIFICANT CHANGE UP (ref 80–100)
MONOCYTES # BLD AUTO: 1.19 K/UL — HIGH (ref 0–0.9)
MONOCYTES NFR BLD AUTO: 6.8 % — SIGNIFICANT CHANGE UP (ref 2–14)
NEUTROPHILS # BLD AUTO: 14.39 K/UL — HIGH (ref 1.8–7.4)
NEUTROPHILS NFR BLD AUTO: 82.7 % — HIGH (ref 43–77)
NRBC # BLD: 0 /100 WBCS — SIGNIFICANT CHANGE UP (ref 0–0)
PHOSPHATE SERPL-MCNC: 3 MG/DL — SIGNIFICANT CHANGE UP (ref 2.5–4.5)
PLATELET # BLD AUTO: 333 K/UL — SIGNIFICANT CHANGE UP (ref 150–400)
POTASSIUM SERPL-MCNC: 4.1 MMOL/L — SIGNIFICANT CHANGE UP (ref 3.5–5.3)
POTASSIUM SERPL-SCNC: 4.1 MMOL/L — SIGNIFICANT CHANGE UP (ref 3.5–5.3)
RBC # BLD: 4.43 M/UL — SIGNIFICANT CHANGE UP (ref 3.8–5.2)
RBC # FLD: 16.4 % — HIGH (ref 10.3–14.5)
SODIUM SERPL-SCNC: 138 MMOL/L — SIGNIFICANT CHANGE UP (ref 135–145)
WBC # BLD: 17.43 K/UL — HIGH (ref 3.8–10.5)
WBC # FLD AUTO: 17.43 K/UL — HIGH (ref 3.8–10.5)

## 2022-01-28 RX ORDER — IBUPROFEN 200 MG
600 TABLET ORAL EVERY 6 HOURS
Refills: 0 | Status: DISCONTINUED | OUTPATIENT
Start: 2022-01-28 | End: 2022-01-31

## 2022-01-28 RX ORDER — IBUPROFEN 200 MG
600 TABLET ORAL EVERY 6 HOURS
Refills: 0 | Status: DISCONTINUED | OUTPATIENT
Start: 2022-01-28 | End: 2022-01-28

## 2022-01-28 RX ORDER — SODIUM CHLORIDE 9 MG/ML
1000 INJECTION, SOLUTION INTRAVENOUS
Refills: 0 | Status: DISCONTINUED | OUTPATIENT
Start: 2022-01-28 | End: 2022-01-30

## 2022-01-28 RX ORDER — ONDANSETRON 8 MG/1
8 TABLET, FILM COATED ORAL EVERY 6 HOURS
Refills: 0 | Status: DISCONTINUED | OUTPATIENT
Start: 2022-01-28 | End: 2022-01-31

## 2022-01-28 RX ADMIN — Medication 1000 MILLIGRAM(S): at 06:39

## 2022-01-28 RX ADMIN — ENOXAPARIN SODIUM 40 MILLIGRAM(S): 100 INJECTION SUBCUTANEOUS at 00:58

## 2022-01-28 RX ADMIN — PIPERACILLIN AND TAZOBACTAM 200 GRAM(S): 4; .5 INJECTION, POWDER, LYOPHILIZED, FOR SOLUTION INTRAVENOUS at 05:16

## 2022-01-28 RX ADMIN — Medication 600 MILLIGRAM(S): at 12:18

## 2022-01-28 RX ADMIN — PIPERACILLIN AND TAZOBACTAM 200 GRAM(S): 4; .5 INJECTION, POWDER, LYOPHILIZED, FOR SOLUTION INTRAVENOUS at 17:35

## 2022-01-28 RX ADMIN — Medication 1000 MILLIGRAM(S): at 00:58

## 2022-01-28 RX ADMIN — SODIUM CHLORIDE 125 MILLILITER(S): 9 INJECTION, SOLUTION INTRAVENOUS at 07:47

## 2022-01-28 RX ADMIN — SODIUM CHLORIDE 125 MILLILITER(S): 9 INJECTION, SOLUTION INTRAVENOUS at 14:55

## 2022-01-28 RX ADMIN — Medication 1000 MILLIGRAM(S): at 16:29

## 2022-01-28 RX ADMIN — Medication 1000 MILLIGRAM(S): at 21:29

## 2022-01-28 RX ADMIN — ENOXAPARIN SODIUM 40 MILLIGRAM(S): 100 INJECTION SUBCUTANEOUS at 23:11

## 2022-01-28 RX ADMIN — Medication 1000 MILLIGRAM(S): at 22:00

## 2022-01-28 RX ADMIN — Medication 1000 MILLIGRAM(S): at 02:00

## 2022-01-28 RX ADMIN — PIPERACILLIN AND TAZOBACTAM 200 GRAM(S): 4; .5 INJECTION, POWDER, LYOPHILIZED, FOR SOLUTION INTRAVENOUS at 23:11

## 2022-01-28 RX ADMIN — PIPERACILLIN AND TAZOBACTAM 200 GRAM(S): 4; .5 INJECTION, POWDER, LYOPHILIZED, FOR SOLUTION INTRAVENOUS at 11:18

## 2022-01-28 RX ADMIN — Medication 1000 MILLIGRAM(S): at 15:28

## 2022-01-28 RX ADMIN — Medication 1000 MILLIGRAM(S): at 07:40

## 2022-01-28 RX ADMIN — Medication 600 MILLIGRAM(S): at 11:18

## 2022-01-28 RX ADMIN — SODIUM CHLORIDE 125 MILLILITER(S): 9 INJECTION, SOLUTION INTRAVENOUS at 22:49

## 2022-01-28 NOTE — DISCHARGE NOTE PROVIDER - NSDCFUADDAPPT_GEN_ALL_CORE_FT
Dr. Marcus, which Infectious Disease, will schedule an appointment for you to see him in the office next week. ([phone])

## 2022-01-28 NOTE — DISCHARGE NOTE PROVIDER - PROVIDER TOKENS
PROVIDER:[TOKEN:[[id number]]] PROVIDER:[TOKEN:[[id number]]],PROVIDER:[TOKEN:[[id number]]]

## 2022-01-28 NOTE — H&P ADULT - HISTORY OF PRESENT ILLNESS
Patient is a 51 year old P2 who is POD 15 s/p  ANGELIC, BS, R cystectomy (hemorrhagic cyst) for large fibroid uterus causing abnormal uterine bleeding with Dr. Stover on . Patient had an unremarkable post op course while in house and was discharged POD2 after meeting all postop milestones. Starting yesterday patient reported a diffuse HA rated 10/10 for which she took tylenol and motrin, with minimal improvement. HA now rated 7/10. She also experienced subjective fevers and chills, and took her temp at home which was 104 deg F at 3pm. Denies any coughs, SOB, dysuria, urinary frequency or urgency, abdominal pain, calf pain, lightheadedness/dizziness, CP, abnormal vaginal discharge or bleeding.    POB:  c/s x2 ('97, '00)    PGynHx: AUB, fibroid uterus s/p ANGELIC, BS, right cystectomy as per HPI    PMH: denies    PSH: c/s x2; abdominoplasty ; breast augmentation , ANGELIC, BS, right cystectomy 21    Meds: tylenol and ibuprofen prn for post op pain    All: No Known Allergies    Soc: neg x 3    Vital Signs Last 24 Hrs  T(C): 38.2 (2022 01:11), Max: 38.2 (2022 01:11)  T(F): 100.8 (2022 01:11), Max: 100.8 (2022 01:11)  HR: 99 (2022 01:11) (99 - 113)  BP: 125/81 (2022 00:41) (109/72 - 125/81)  BP(mean): --  RR: 16 (2022 01:11) (16 - 18)  SpO2: 96% (2022 01:11) (95% - 98%)    Physical Exam:  Gen: NAD  GI: soft, nontender, nondistended + BS, no rebound no guarding. Pfannenstiel skin incision noted to be erythematous with induration in the midline, minimal fluctuance towards the mons, no pain to palpation, intact without drainage.  Ext: no edema, erythema, tenderness     LABS:                        12.8   19.29 )-----------( 408      ( 2022 18:43 )             40.6         140  |  103  |  12  ----------------------------<  101<H>  4.2   |  25  |  0.73    Ca    10.0      2022 18:43    TPro  8.0  /  Alb  4.2  /  TBili  0.6  /  DBili  x   /  AST  15  /  ALT  15  /  AlkPhos  63        Urinalysis Basic - ( 2022 18:36 )    Color: Yellow / Appearance: Clear / S.020 / pH: x  Gluc: x / Ketone: Trace mg/dL  / Bili: Negative / Urobili: 0.2 E.U./dL   Blood: x / Protein: Trace mg/dL / Nitrite: NEGATIVE   Leuk Esterase: NEGATIVE / RBC: < 5 /HPF / WBC < 5 /HPF   Sq Epi: x / Non Sq Epi: 0-5 /HPF / Bacteria: Present /HPF        RADIOLOGY & ADDITIONAL STUDIES:    ACC: [id number] EXAM:  CT ABDOMEN AND PELVIS IC                          PROCEDURE DATE:  2022          INTERPRETATION:  CT SCAN OF ABDOMEN AND PELVIS    History: Fever, recent hysterectomy, bilateral salpingectomy, left   ovarian cystectomy. Rule out collection/abscess.    Technique: CT scan of abdomen and pelvis was performed from lung bases   through symphysis pubis. Intravenous contrast material was utilized. Oral   contrast not utilized. Axial, sagittal and coronal reformatted images   were reviewed.    Comparison: None.    Findings:    Lower chest: Mild centrilobular emphysematous changes. Linear atelectasis   bilateral lung bases. Partially visualized bilateral breast implants.    Liver:  Normal.    Gallbladder: No radiopaque stones gallbladder.    Spleen:  Normal.    Pancreas:  Normal.    Adrenal glands:  Normal.    Kidneys: No renal stones or hydronephrosis bilaterally.    Adenopathy:  Mildly enlarged bilateral external iliac nodes, measuring up   to 1.2 cm on the right. These are probably reactive.    Ascites: None.    Gastrointestinal tract/peritoneum: Limited evaluation without the use of   enteric contrast. Normal appendix. Large stool throughout the colon. No   obstruction or free air.    Vessels: Normal.    Pelvicorgans: Post supracervical hysterectomy. Normal bladder. No large   adnexal masses. Trace pelvic fluid.    Soft tissues: Extensive subcutaneous fat stranding of the lower abdominal   wall, likely post surgical. There is a 5.7 x 0.9 x 3.1 cm (CC x AP x TR)   partially organized fluid at the level of the incision site between the   abdominal wall fascia and inferior rectus abdominis musculature with   subtle rim enhancement.. Post abdominoplasty. Punctate foci subcutaneous   air left abdominal wall.    Bones: Mild degenerative changes of the spine.    Impression:  1.  Post supracervical hysterectomy. No discrete abdominal or pelvic   fluid collection.  2.  5.7 cm partially organized rim-enhancing fluid at the level of the   incision site between the lower rectus abdominis musculature and   abdominal wall fascia, probable for phlegmon versus early abscess.    I, Adin Banerjee MD, have reviewed the images and the report and agree   with the findings, with the following modification: Also noted is a small   focus of pneumoperitoneum in the pelvis on the left adjacent to the   sigmoid colon most likely related to recent surgery.    --- End of Report ---          ANDRA ZAMORA MD; Resident Radiologist  This document has been electronically signed.  ADIN BANERJEE MD; Attending Radiologist  This document has been electronically signed. 2022  9:34PM

## 2022-01-28 NOTE — DISCHARGE NOTE PROVIDER - CARE PROVIDERS DIRECT ADDRESSES
,DirectAddress_Unknown ,DirectAddress_Unknown,[email or web address]

## 2022-01-28 NOTE — DISCHARGE NOTE PROVIDER - CARE PROVIDER_API CALL
Nikki Stover (MD)  Obstetrics and Gynecology  [address], [place] [address]  Phone: [phone]  Fax: [phone]  Follow Up Time:    Nikki Stover)  Obstetrics and Gynecology  [address], [place] [address]  Phone: [phone]  Fax: [phone]  Follow Up Time:     Alfred Marcus)  Internal Medicine  [address] [place] [address]  Phone: [phone]  Fax: [phone]  Follow Up Time:

## 2022-01-28 NOTE — DISCHARGE NOTE PROVIDER - HOSPITAL COURSE
50yo patient POD15 from a ANGELIC, BS, R ovarian cystectomy presented to the emergency room for fevers and chills.  She was found to be febrile and tachycardic.  CT imaging noted a suspected 5cm abscess.   She was admitted for IV antibiotics (zosyn), cultures, trending labs, hydration. 50yo patient POD15 from a ANGELIC, BS, R ovarian cystectomy presented to the emergency room for fevers and chills.  She was found to be febrile and tachycardic.  CT imaging noted a suspected 5cm abscess.   She was admitted for IV antibiotics (zosyn and vancomycin) and cultures. Wound culture grew MSSA. Infectious disease consulted. Pt afebrile for 48hours and normalized WBC count. Per ID recommendations, pt sent home on PO keflex until 2/11 with outpatient ID follow up.

## 2022-01-28 NOTE — DISCHARGE NOTE PROVIDER - NSDCMRMEDTOKEN_GEN_ALL_CORE_FT
acetaminophen 500 mg oral tablet: 2 tab(s) orally every 6 hours   acetaminophen 500 mg oral tablet: 2 tab(s) orally every 6 hours  cephalexin 500 mg oral tablet: 1 tab(s) orally every 6 hours

## 2022-01-28 NOTE — DISCHARGE NOTE PROVIDER - NSDCFUADDINST_GEN_ALL_CORE_FT
- Nothing in vagina - no intercourse, tampons, or douching until cleared by your doctor.   - Avoid swimming, tub baths, strenuous activity and heavy lifting until cleared by your doctor.   - Showering is ok.   - Continue oral pain medications as needed for pain.    - Follow up in office in 1-2 weeks.  Call the office to confirm your follow up appointment.   - Call the office sooner if you develop severe pain, heavy vaginal bleeding greater than 2 pads in 2 hours, or fevers greater than 100.4 F. Go to the closest emergency room for any of these symptoms if you are not able to contact your doctor. Please continue to take the Keflex 500mg every 6 hours until 2/11      - Nothing in vagina - no intercourse, tampons, or douching until cleared by your doctor.   - Avoid swimming, tub baths, strenuous activity and heavy lifting until cleared by your doctor.   - Showering is ok.   - Continue oral pain medications as needed for pain.    - Follow up in office in 1-2 weeks.  Call the office to confirm your follow up appointment.   - Call the office sooner if you develop severe pain, heavy vaginal bleeding greater than 2 pads in 2 hours, or fevers greater than 100.4 F. Go to the closest emergency room for any of these symptoms if you are not able to contact your doctor.

## 2022-01-28 NOTE — H&P ADULT - ASSESSMENT
Patient is a 51 year old P2 who is POD 15 s/p ANGELIC, BS, R cystectomy (hemorrhagic cyst) for large fibroid uterus causing abnormal uterine bleeding with Dr. Stover on 1/12. Admitted for Post op fever, CT suspicious for intra-abdominal abscess.  Neuro: Tylenol, prn  Cards: euvolemic   Resp: RA  FEN/GI: diet, PPI, zofran prn  : voiding  ID: intra-abdominal abscess noted on CT A/P. TLast 100.8 at about 1:45am. f/u blood and urine Clx, Zosyn 3.375 (1/27-)  PPX: SCDs, lovenox

## 2022-01-29 LAB
ANION GAP SERPL CALC-SCNC: 12 MMOL/L — SIGNIFICANT CHANGE UP (ref 5–17)
BASOPHILS # BLD AUTO: 0.04 K/UL — SIGNIFICANT CHANGE UP (ref 0–0.2)
BASOPHILS NFR BLD AUTO: 0.3 % — SIGNIFICANT CHANGE UP (ref 0–2)
BUN SERPL-MCNC: 8 MG/DL — SIGNIFICANT CHANGE UP (ref 7–23)
CALCIUM SERPL-MCNC: 8.7 MG/DL — SIGNIFICANT CHANGE UP (ref 8.4–10.5)
CHLORIDE SERPL-SCNC: 106 MMOL/L — SIGNIFICANT CHANGE UP (ref 96–108)
CO2 SERPL-SCNC: 21 MMOL/L — LOW (ref 22–31)
CREAT SERPL-MCNC: 0.68 MG/DL — SIGNIFICANT CHANGE UP (ref 0.5–1.3)
CULTURE RESULTS: SIGNIFICANT CHANGE UP
EOSINOPHIL # BLD AUTO: 0.06 K/UL — SIGNIFICANT CHANGE UP (ref 0–0.5)
EOSINOPHIL NFR BLD AUTO: 0.4 % — SIGNIFICANT CHANGE UP (ref 0–6)
GLUCOSE SERPL-MCNC: 88 MG/DL — SIGNIFICANT CHANGE UP (ref 70–99)
HCT VFR BLD CALC: 32.4 % — LOW (ref 34.5–45)
HGB BLD-MCNC: 10.4 G/DL — LOW (ref 11.5–15.5)
IMM GRANULOCYTES NFR BLD AUTO: 0.5 % — SIGNIFICANT CHANGE UP (ref 0–1.5)
LDH SERPL L TO P-CCNC: 115 U/L — SIGNIFICANT CHANGE UP (ref 50–242)
LYMPHOCYTES # BLD AUTO: 1.87 K/UL — SIGNIFICANT CHANGE UP (ref 1–3.3)
LYMPHOCYTES # BLD AUTO: 12.5 % — LOW (ref 13–44)
MAGNESIUM SERPL-MCNC: 1.7 MG/DL — SIGNIFICANT CHANGE UP (ref 1.6–2.6)
MCHC RBC-ENTMCNC: 27.4 PG — SIGNIFICANT CHANGE UP (ref 27–34)
MCHC RBC-ENTMCNC: 32.1 GM/DL — SIGNIFICANT CHANGE UP (ref 32–36)
MCV RBC AUTO: 85.3 FL — SIGNIFICANT CHANGE UP (ref 80–100)
MONOCYTES # BLD AUTO: 0.99 K/UL — HIGH (ref 0–0.9)
MONOCYTES NFR BLD AUTO: 6.6 % — SIGNIFICANT CHANGE UP (ref 2–14)
NEUTROPHILS # BLD AUTO: 11.95 K/UL — HIGH (ref 1.8–7.4)
NEUTROPHILS NFR BLD AUTO: 79.7 % — HIGH (ref 43–77)
NRBC # BLD: 0 /100 WBCS — SIGNIFICANT CHANGE UP (ref 0–0)
PHOSPHATE SERPL-MCNC: 3.1 MG/DL — SIGNIFICANT CHANGE UP (ref 2.5–4.5)
PLATELET # BLD AUTO: 320 K/UL — SIGNIFICANT CHANGE UP (ref 150–400)
POTASSIUM SERPL-MCNC: 3.5 MMOL/L — SIGNIFICANT CHANGE UP (ref 3.5–5.3)
POTASSIUM SERPL-SCNC: 3.5 MMOL/L — SIGNIFICANT CHANGE UP (ref 3.5–5.3)
RBC # BLD: 3.8 M/UL — SIGNIFICANT CHANGE UP (ref 3.8–5.2)
RBC # FLD: 15.9 % — HIGH (ref 10.3–14.5)
SODIUM SERPL-SCNC: 139 MMOL/L — SIGNIFICANT CHANGE UP (ref 135–145)
SPECIMEN SOURCE: SIGNIFICANT CHANGE UP
WBC # BLD: 14.99 K/UL — HIGH (ref 3.8–10.5)
WBC # FLD AUTO: 14.99 K/UL — HIGH (ref 3.8–10.5)

## 2022-01-29 PROCEDURE — 99222 1ST HOSP IP/OBS MODERATE 55: CPT

## 2022-01-29 RX ORDER — VANCOMYCIN HCL 1 G
1000 VIAL (EA) INTRAVENOUS EVERY 12 HOURS
Refills: 0 | Status: DISCONTINUED | OUTPATIENT
Start: 2022-01-29 | End: 2022-01-31

## 2022-01-29 RX ORDER — METOCLOPRAMIDE HCL 10 MG
10 TABLET ORAL ONCE
Refills: 0 | Status: COMPLETED | OUTPATIENT
Start: 2022-01-29 | End: 2022-01-30

## 2022-01-29 RX ADMIN — PIPERACILLIN AND TAZOBACTAM 200 GRAM(S): 4; .5 INJECTION, POWDER, LYOPHILIZED, FOR SOLUTION INTRAVENOUS at 17:10

## 2022-01-29 RX ADMIN — Medication 1000 MILLIGRAM(S): at 17:11

## 2022-01-29 RX ADMIN — Medication 600 MILLIGRAM(S): at 16:00

## 2022-01-29 RX ADMIN — Medication 250 MILLIGRAM(S): at 17:52

## 2022-01-29 RX ADMIN — SODIUM CHLORIDE 125 MILLILITER(S): 9 INJECTION, SOLUTION INTRAVENOUS at 16:49

## 2022-01-29 RX ADMIN — Medication 1000 MILLIGRAM(S): at 05:50

## 2022-01-29 RX ADMIN — PIPERACILLIN AND TAZOBACTAM 200 GRAM(S): 4; .5 INJECTION, POWDER, LYOPHILIZED, FOR SOLUTION INTRAVENOUS at 23:11

## 2022-01-29 RX ADMIN — Medication 1000 MILLIGRAM(S): at 05:07

## 2022-01-29 RX ADMIN — Medication 1000 MILLIGRAM(S): at 17:54

## 2022-01-29 RX ADMIN — Medication 1000 MILLIGRAM(S): at 12:20

## 2022-01-29 RX ADMIN — PIPERACILLIN AND TAZOBACTAM 200 GRAM(S): 4; .5 INJECTION, POWDER, LYOPHILIZED, FOR SOLUTION INTRAVENOUS at 05:08

## 2022-01-29 RX ADMIN — Medication 1000 MILLIGRAM(S): at 11:24

## 2022-01-29 RX ADMIN — ENOXAPARIN SODIUM 40 MILLIGRAM(S): 100 INJECTION SUBCUTANEOUS at 23:12

## 2022-01-29 RX ADMIN — PIPERACILLIN AND TAZOBACTAM 200 GRAM(S): 4; .5 INJECTION, POWDER, LYOPHILIZED, FOR SOLUTION INTRAVENOUS at 11:21

## 2022-01-29 RX ADMIN — Medication 600 MILLIGRAM(S): at 15:17

## 2022-01-29 NOTE — CONSULT NOTE ADULT - ASSESSMENT
50 yo female with hx fibroids s/p ANGELIC 1/12/22, now presenting with fever/rigors, found to have 6 X 3 cm rebecca-incisional abdominal wall abscess c/w SSI.   - f/u bcx 1/27--ngtd  - continue empiric Zosyn 3.375g IV q6h  - add empiric vancomycin 1g IV q12h (check trough prior to 4th dose)  - may need to pursue drainage of abscess for source control (as well as for culture data) if fails above conservative management    d/w primary team    ID Team 2

## 2022-01-30 LAB
ANION GAP SERPL CALC-SCNC: 8 MMOL/L — SIGNIFICANT CHANGE UP (ref 5–17)
BASOPHILS # BLD AUTO: 0.05 K/UL — SIGNIFICANT CHANGE UP (ref 0–0.2)
BASOPHILS NFR BLD AUTO: 0.5 % — SIGNIFICANT CHANGE UP (ref 0–2)
BUN SERPL-MCNC: 6 MG/DL — LOW (ref 7–23)
CALCIUM SERPL-MCNC: 9.1 MG/DL — SIGNIFICANT CHANGE UP (ref 8.4–10.5)
CHLORIDE SERPL-SCNC: 108 MMOL/L — SIGNIFICANT CHANGE UP (ref 96–108)
CO2 SERPL-SCNC: 24 MMOL/L — SIGNIFICANT CHANGE UP (ref 22–31)
CREAT SERPL-MCNC: 0.69 MG/DL — SIGNIFICANT CHANGE UP (ref 0.5–1.3)
EOSINOPHIL # BLD AUTO: 0.18 K/UL — SIGNIFICANT CHANGE UP (ref 0–0.5)
EOSINOPHIL NFR BLD AUTO: 1.8 % — SIGNIFICANT CHANGE UP (ref 0–6)
GLUCOSE SERPL-MCNC: 85 MG/DL — SIGNIFICANT CHANGE UP (ref 70–99)
HCT VFR BLD CALC: 32 % — LOW (ref 34.5–45)
HGB BLD-MCNC: 10 G/DL — LOW (ref 11.5–15.5)
IMM GRANULOCYTES NFR BLD AUTO: 0.3 % — SIGNIFICANT CHANGE UP (ref 0–1.5)
LYMPHOCYTES # BLD AUTO: 1.39 K/UL — SIGNIFICANT CHANGE UP (ref 1–3.3)
LYMPHOCYTES # BLD AUTO: 14.3 % — SIGNIFICANT CHANGE UP (ref 13–44)
MAGNESIUM SERPL-MCNC: 1.8 MG/DL — SIGNIFICANT CHANGE UP (ref 1.6–2.6)
MCHC RBC-ENTMCNC: 27.5 PG — SIGNIFICANT CHANGE UP (ref 27–34)
MCHC RBC-ENTMCNC: 31.3 GM/DL — LOW (ref 32–36)
MCV RBC AUTO: 87.9 FL — SIGNIFICANT CHANGE UP (ref 80–100)
MONOCYTES # BLD AUTO: 0.66 K/UL — SIGNIFICANT CHANGE UP (ref 0–0.9)
MONOCYTES NFR BLD AUTO: 6.8 % — SIGNIFICANT CHANGE UP (ref 2–14)
NEUTROPHILS # BLD AUTO: 7.43 K/UL — HIGH (ref 1.8–7.4)
NEUTROPHILS NFR BLD AUTO: 76.3 % — SIGNIFICANT CHANGE UP (ref 43–77)
NRBC # BLD: 0 /100 WBCS — SIGNIFICANT CHANGE UP (ref 0–0)
PHOSPHATE SERPL-MCNC: 3.2 MG/DL — SIGNIFICANT CHANGE UP (ref 2.5–4.5)
PLATELET # BLD AUTO: 330 K/UL — SIGNIFICANT CHANGE UP (ref 150–400)
POTASSIUM SERPL-MCNC: 3.7 MMOL/L — SIGNIFICANT CHANGE UP (ref 3.5–5.3)
POTASSIUM SERPL-SCNC: 3.7 MMOL/L — SIGNIFICANT CHANGE UP (ref 3.5–5.3)
RBC # BLD: 3.64 M/UL — LOW (ref 3.8–5.2)
RBC # FLD: 15.5 % — HIGH (ref 10.3–14.5)
SODIUM SERPL-SCNC: 140 MMOL/L — SIGNIFICANT CHANGE UP (ref 135–145)
WBC # BLD: 9.74 K/UL — SIGNIFICANT CHANGE UP (ref 3.8–10.5)
WBC # FLD AUTO: 9.74 K/UL — SIGNIFICANT CHANGE UP (ref 3.8–10.5)

## 2022-01-30 RX ORDER — MAGNESIUM SULFATE 500 MG/ML
2 VIAL (ML) INJECTION ONCE
Refills: 0 | Status: COMPLETED | OUTPATIENT
Start: 2022-01-30 | End: 2022-01-30

## 2022-01-30 RX ORDER — POTASSIUM CHLORIDE 20 MEQ
20 PACKET (EA) ORAL
Refills: 0 | Status: COMPLETED | OUTPATIENT
Start: 2022-01-30 | End: 2022-01-30

## 2022-01-30 RX ADMIN — PIPERACILLIN AND TAZOBACTAM 200 GRAM(S): 4; .5 INJECTION, POWDER, LYOPHILIZED, FOR SOLUTION INTRAVENOUS at 11:36

## 2022-01-30 RX ADMIN — PIPERACILLIN AND TAZOBACTAM 200 GRAM(S): 4; .5 INJECTION, POWDER, LYOPHILIZED, FOR SOLUTION INTRAVENOUS at 05:25

## 2022-01-30 RX ADMIN — Medication 20 MILLIEQUIVALENT(S): at 11:37

## 2022-01-30 RX ADMIN — Medication 10 MILLIGRAM(S): at 09:36

## 2022-01-30 RX ADMIN — PIPERACILLIN AND TAZOBACTAM 200 GRAM(S): 4; .5 INJECTION, POWDER, LYOPHILIZED, FOR SOLUTION INTRAVENOUS at 23:15

## 2022-01-30 RX ADMIN — Medication 1000 MILLIGRAM(S): at 21:11

## 2022-01-30 RX ADMIN — Medication 250 MILLIGRAM(S): at 06:07

## 2022-01-30 RX ADMIN — Medication 250 MILLIGRAM(S): at 18:08

## 2022-01-30 RX ADMIN — Medication 25 GRAM(S): at 12:27

## 2022-01-30 RX ADMIN — Medication 1000 MILLIGRAM(S): at 02:55

## 2022-01-30 RX ADMIN — Medication 1000 MILLIGRAM(S): at 22:10

## 2022-01-30 RX ADMIN — PIPERACILLIN AND TAZOBACTAM 200 GRAM(S): 4; .5 INJECTION, POWDER, LYOPHILIZED, FOR SOLUTION INTRAVENOUS at 17:27

## 2022-01-30 RX ADMIN — SODIUM CHLORIDE 125 MILLILITER(S): 9 INJECTION, SOLUTION INTRAVENOUS at 07:49

## 2022-01-30 RX ADMIN — Medication 1000 MILLIGRAM(S): at 03:40

## 2022-01-30 RX ADMIN — Medication 20 MILLIEQUIVALENT(S): at 14:40

## 2022-01-31 ENCOUNTER — TRANSCRIPTION ENCOUNTER (OUTPATIENT)
Age: 52
End: 2022-01-31

## 2022-01-31 VITALS
SYSTOLIC BLOOD PRESSURE: 102 MMHG | TEMPERATURE: 98 F | OXYGEN SATURATION: 97 % | DIASTOLIC BLOOD PRESSURE: 66 MMHG | RESPIRATION RATE: 17 BRPM | HEART RATE: 86 BPM

## 2022-01-31 LAB
ANION GAP SERPL CALC-SCNC: 12 MMOL/L — SIGNIFICANT CHANGE UP (ref 5–17)
BASOPHILS # BLD AUTO: 0.05 K/UL — SIGNIFICANT CHANGE UP (ref 0–0.2)
BASOPHILS NFR BLD AUTO: 0.6 % — SIGNIFICANT CHANGE UP (ref 0–2)
BUN SERPL-MCNC: 5 MG/DL — LOW (ref 7–23)
CALCIUM SERPL-MCNC: 8.7 MG/DL — SIGNIFICANT CHANGE UP (ref 8.4–10.5)
CHLORIDE SERPL-SCNC: 110 MMOL/L — HIGH (ref 96–108)
CO2 SERPL-SCNC: 22 MMOL/L — SIGNIFICANT CHANGE UP (ref 22–31)
CREAT SERPL-MCNC: 0.75 MG/DL — SIGNIFICANT CHANGE UP (ref 0.5–1.3)
EOSINOPHIL # BLD AUTO: 0.29 K/UL — SIGNIFICANT CHANGE UP (ref 0–0.5)
EOSINOPHIL NFR BLD AUTO: 3.4 % — SIGNIFICANT CHANGE UP (ref 0–6)
GLUCOSE SERPL-MCNC: 94 MG/DL — SIGNIFICANT CHANGE UP (ref 70–99)
GRAM STN FLD: SIGNIFICANT CHANGE UP
HCT VFR BLD CALC: 33.7 % — LOW (ref 34.5–45)
HGB BLD-MCNC: 10.3 G/DL — LOW (ref 11.5–15.5)
IMM GRANULOCYTES NFR BLD AUTO: 0.2 % — SIGNIFICANT CHANGE UP (ref 0–1.5)
LYMPHOCYTES # BLD AUTO: 2.12 K/UL — SIGNIFICANT CHANGE UP (ref 1–3.3)
LYMPHOCYTES # BLD AUTO: 24.7 % — SIGNIFICANT CHANGE UP (ref 13–44)
MAGNESIUM SERPL-MCNC: 2.2 MG/DL — SIGNIFICANT CHANGE UP (ref 1.6–2.6)
MCHC RBC-ENTMCNC: 26.8 PG — LOW (ref 27–34)
MCHC RBC-ENTMCNC: 30.6 GM/DL — LOW (ref 32–36)
MCV RBC AUTO: 87.5 FL — SIGNIFICANT CHANGE UP (ref 80–100)
MONOCYTES # BLD AUTO: 0.67 K/UL — SIGNIFICANT CHANGE UP (ref 0–0.9)
MONOCYTES NFR BLD AUTO: 7.8 % — SIGNIFICANT CHANGE UP (ref 2–14)
NEUTROPHILS # BLD AUTO: 5.44 K/UL — SIGNIFICANT CHANGE UP (ref 1.8–7.4)
NEUTROPHILS NFR BLD AUTO: 63.3 % — SIGNIFICANT CHANGE UP (ref 43–77)
NRBC # BLD: 0 /100 WBCS — SIGNIFICANT CHANGE UP (ref 0–0)
PHOSPHATE SERPL-MCNC: 3.7 MG/DL — SIGNIFICANT CHANGE UP (ref 2.5–4.5)
PLATELET # BLD AUTO: 360 K/UL — SIGNIFICANT CHANGE UP (ref 150–400)
POTASSIUM SERPL-MCNC: 4.4 MMOL/L — SIGNIFICANT CHANGE UP (ref 3.5–5.3)
POTASSIUM SERPL-SCNC: 4.4 MMOL/L — SIGNIFICANT CHANGE UP (ref 3.5–5.3)
RBC # BLD: 3.85 M/UL — SIGNIFICANT CHANGE UP (ref 3.8–5.2)
RBC # FLD: 15.4 % — HIGH (ref 10.3–14.5)
SODIUM SERPL-SCNC: 144 MMOL/L — SIGNIFICANT CHANGE UP (ref 135–145)
SPECIMEN SOURCE: SIGNIFICANT CHANGE UP
VANCOMYCIN TROUGH SERPL-MCNC: 12.1 UG/ML — SIGNIFICANT CHANGE UP (ref 10–20)
WBC # BLD: 8.59 K/UL — SIGNIFICANT CHANGE UP (ref 3.8–10.5)
WBC # FLD AUTO: 8.59 K/UL — SIGNIFICANT CHANGE UP (ref 3.8–10.5)

## 2022-01-31 PROCEDURE — 96374 THER/PROPH/DIAG INJ IV PUSH: CPT

## 2022-01-31 PROCEDURE — U0003: CPT

## 2022-01-31 PROCEDURE — 83615 LACTATE (LD) (LDH) ENZYME: CPT

## 2022-01-31 PROCEDURE — 80053 COMPREHEN METABOLIC PANEL: CPT

## 2022-01-31 PROCEDURE — 36415 COLL VENOUS BLD VENIPUNCTURE: CPT

## 2022-01-31 PROCEDURE — 87086 URINE CULTURE/COLONY COUNT: CPT

## 2022-01-31 PROCEDURE — 99232 SBSQ HOSP IP/OBS MODERATE 35: CPT

## 2022-01-31 PROCEDURE — 80048 BASIC METABOLIC PNL TOTAL CA: CPT

## 2022-01-31 PROCEDURE — 83605 ASSAY OF LACTIC ACID: CPT

## 2022-01-31 PROCEDURE — 81001 URINALYSIS AUTO W/SCOPE: CPT

## 2022-01-31 PROCEDURE — 74177 CT ABD & PELVIS W/CONTRAST: CPT | Mod: MC

## 2022-01-31 PROCEDURE — U0005: CPT

## 2022-01-31 PROCEDURE — 80202 ASSAY OF VANCOMYCIN: CPT

## 2022-01-31 PROCEDURE — 83735 ASSAY OF MAGNESIUM: CPT

## 2022-01-31 PROCEDURE — 84100 ASSAY OF PHOSPHORUS: CPT

## 2022-01-31 PROCEDURE — 87040 BLOOD CULTURE FOR BACTERIA: CPT

## 2022-01-31 PROCEDURE — 85025 COMPLETE CBC W/AUTO DIFF WBC: CPT

## 2022-01-31 PROCEDURE — 87075 CULTR BACTERIA EXCEPT BLOOD: CPT

## 2022-01-31 PROCEDURE — 87186 SC STD MICRODIL/AGAR DIL: CPT

## 2022-01-31 PROCEDURE — 87070 CULTURE OTHR SPECIMN AEROBIC: CPT

## 2022-01-31 PROCEDURE — 99285 EMERGENCY DEPT VISIT HI MDM: CPT | Mod: 25

## 2022-01-31 PROCEDURE — 96361 HYDRATE IV INFUSION ADD-ON: CPT

## 2022-01-31 RX ORDER — CEPHALEXIN 500 MG
1 CAPSULE ORAL
Qty: 45 | Refills: 0
Start: 2022-01-31

## 2022-01-31 RX ADMIN — PIPERACILLIN AND TAZOBACTAM 200 GRAM(S): 4; .5 INJECTION, POWDER, LYOPHILIZED, FOR SOLUTION INTRAVENOUS at 17:33

## 2022-01-31 RX ADMIN — PIPERACILLIN AND TAZOBACTAM 200 GRAM(S): 4; .5 INJECTION, POWDER, LYOPHILIZED, FOR SOLUTION INTRAVENOUS at 11:28

## 2022-01-31 RX ADMIN — Medication 250 MILLIGRAM(S): at 06:02

## 2022-01-31 RX ADMIN — PIPERACILLIN AND TAZOBACTAM 200 GRAM(S): 4; .5 INJECTION, POWDER, LYOPHILIZED, FOR SOLUTION INTRAVENOUS at 05:01

## 2022-01-31 NOTE — DISCHARGE NOTE NURSING/CASE MANAGEMENT/SOCIAL WORK - NSDCPEFALRISK_GEN_ALL_CORE
For information on Fall & Injury Prevention, visit: [email or web address] OR  [email or web address] OR  https://www.cdc.gov/steadi/patient.html

## 2022-01-31 NOTE — DISCHARGE NOTE NURSING/CASE MANAGEMENT/SOCIAL WORK - PATIENT PORTAL LINK FT
You can access the FollowMyHealth Patient Portal offered by [facility] by registering at the following website: [email or web address]. By joining [facility]’s FollowMyHealth portal, you will also be able to view your health information using other applications (apps) compatible with our system.

## 2022-01-31 NOTE — DISCHARGE NOTE NURSING/CASE MANAGEMENT/SOCIAL WORK - NSDCFUADDAPPT_GEN_ALL_CORE_FT
Dr. Marcus, which Infectious Disease, will schedule an appointment for you to see him in the office next week. ([phone])

## 2022-01-31 NOTE — PROGRESS NOTE ADULT - ATTENDING COMMENTS
pt seen and examined  PE incison w serious drainage  +bs soft nt  A/P  resolving post op abscess MSSA  appreciate id consult  dc home on po abx  rv 1 week w id and w me
pt seen and examined by me  no more chills  monika reg diet +ambulation urination bm no nv  vss reviewed  pe  gen awake alert nad  ab +bs soft, incision cdi w induration at incision, no rg  ex neg gaurang b  labs reviewed  a/p  post op infection, early abscess between rectus muscle and fascia  elevated wbc w left shift  continue zosyn repeat labs in am  contine fluids
pt seen and examined  no chills +bm ambulation urination no nv no leg pain   vss as noted  pe  gen awake alert nad  ab nt +bs incision w ;ess than .5 cm opening probed to 3cm deep, serosang dc, fascia intact  ex nt  a/p  post op abscess id consult appricated  continue abx  recheck labs in am

## 2022-01-31 NOTE — PROGRESS NOTE ADULT - SUBJECTIVE AND OBJECTIVE BOX
GYN Progress Note    Patient seen at bedside. Episode of chills last night, resolved after 15 minutes. Fever 102 at 5am, resolved with tylenol. Pain controlled overnight. Tolerating regular diet. OOB ambulating. Voiding, +flatus.     Denies CP, palpitations, SOB, fever, chills, nausea, vomiting.    Vital Signs Last 24 Hrs  T(C): 36.9 (2022 05:50), Max: 39.2 (2022 05:00)  T(F): 98.5 (2022 05:50), Max: 102.6 (2022 05:00)  HR: 98 (2022 05:50) (94 - 108)  BP: 114/71 (2022 05:00) (95/64 - 121/77)  BP(mean): 85 (2022 05:00) (85 - 85)  RR: 17 (2022 05:00) (15 - 17)  SpO2: 93% (2022 05:00) (93% - 99%)    Physical Exam:  Gen: No Acute Distress  GI: Area of erythema unchanged from yesterday. It is noted around the incision, with area of induration in the midline superior and inferior to incision, without any apparent fluctuant collections. Incision intact with drainage. Mons noted to be tender to palpation and swollen. Otherwise soft, appropriately nontender, nondistended, +BS, no rebound, no guarding.  Ext: SCDs in place, [facility]    I&O's Summary    2022 07:  -  2022 07:00  --------------------------------------------------------  IN: 3470 mL / OUT: 1401 mL / NET: 2069 mL    2022 07:01  -  2022 08:57  --------------------------------------------------------  IN: 125 mL / OUT: 0 mL / NET: 125 mL      MEDICATIONS  (STANDING):  enoxaparin Injectable 40 milliGRAM(s) SubCutaneous every 24 hours  lactated ringers. 1000 milliLiter(s) (125 mL/Hr) IV Continuous <Continuous>  piperacillin/tazobactam IVPB.. 3.375 Gram(s) IV Intermittent every 6 hours    MEDICATIONS  (PRN):  acetaminophen     Tablet .. 1000 milliGRAM(s) Oral every 6 hours PRN Moderate Pain (4 - 6)  ibuprofen  Tablet. 600 milliGRAM(s) Oral every 6 hours PRN Mild Pain (1 - 3)  ondansetron Injectable 8 milliGRAM(s) IV Push every 6 hours PRN N/V first line      LABS:                        10.4   14.99 )-----------( 320      ( 2022 08:39 )             32.4         138  |  105  |  11  ----------------------------<  88  4.1   |  22  |  0.78    Ca    9.6      2022 07:41  Phos  3.0       Mg     2.0         TPro  8.0  /  Alb  4.2  /  TBili  0.6  /  DBili  x   /  AST  15  /  ALT  15  /  AlkPhos  63        Urinalysis Basic - ( 2022 18:36 )    Color: Yellow / Appearance: Clear / S.020 / pH: x  Gluc: x / Ketone: Trace mg/dL  / Bili: Negative / Urobili: 0.2 E.U./dL   Blood: x / Protein: Trace mg/dL / Nitrite: NEGATIVE   Leuk Esterase: NEGATIVE / RBC: < 5 /HPF / WBC < 5 /HPF   Sq Epi: x / Non Sq Epi: 0-5 /HPF / Bacteria: Present /HPF            
Patient evaluated at bedside. She c/o chills. She denies any pain. She denies HA, dizziness, SOB, CP, palpitations, n/v.    T(C): 36.7 (01-28-22 @ 08:45), Max: 38.2 (01-28-22 @ 01:11)  HR: 96 (01-28-22 @ 08:45) (96 - 113)  BP: 112/75 (01-28-22 @ 08:45) (101/67 - 125/81)  RR: 17 (01-28-22 @ 08:45) (16 - 18)  SpO2: 96% (01-28-22 @ 08:45) (95% - 98%)    GA: no acute distress, bundled up  Resp: normal respiratory effort  Abd: +BS, soft, NT/ND, no rebound or guarding, incision indurated but no fluctuance, nontender to palpation, intact without drainage, erythema  not expanding past the previous marking  Extrem: SCDs in place, no LE edema       01-27 @ 07:01  -  01-28 @ 07:00  --------------------------------------------------------  IN: 370 mL / OUT: 450 mL / NET: -80 mL                              11.8   17.43 )-----------( 333      ( 28 Jan 2022 07:41 )             39.0     01-28    138  |  105  |  11  ----------------------------<  88  4.1   |  22  |  0.78    Ca    9.6      28 Jan 2022 07:41  Phos  3.0     01-28  Mg     2.0     01-28    TPro  8.0  /  Alb  4.2  /  TBili  0.6  /  DBili  x   /  AST  15  /  ALT  15  /  AlkPhos  63  01-27    
Pt seen and examined at bedside. Pt denies abdominal pain. Pt [x] ambulating, tolerating reg diet, [x] flatus,  [x] urinating adequately.   Pt denies fever or chills overnight, chest pain, SOB, nausea, vomiting, lightheadedness, dizziness.      T(F): 99 (01-30-22 @ 05:17), Max: 99 (01-30-22 @ 05:17)  HR: 83 (01-30-22 @ 05:17) (78 - 96)  BP: 129/85 (01-30-22 @ 05:17) (110/72 - 129/85)  RR: 18 (01-30-22 @ 05:17) (15 - 18)  SpO2: 95% (01-30-22 @ 05:17) (95% - 98%)  Wt(kg): --  I&O's Summary    28 Jan 2022 07:01  -  29 Jan 2022 07:00  --------------------------------------------------------  IN: 3470 mL / OUT: 1401 mL / NET: 2069 mL    29 Jan 2022 07:01  -  30 Jan 2022 06:40  --------------------------------------------------------  IN: 3775 mL / OUT: 1575 mL / NET: 2200 mL        MEDICATIONS  (STANDING):  enoxaparin Injectable 40 milliGRAM(s) SubCutaneous every 24 hours  lactated ringers. 1000 milliLiter(s) (125 mL/Hr) IV Continuous <Continuous>  metoclopramide Injectable 10 milliGRAM(s) IV Push once  piperacillin/tazobactam IVPB.. 3.375 Gram(s) IV Intermittent every 6 hours  vancomycin  IVPB 1000 milliGRAM(s) IV Intermittent every 12 hours    MEDICATIONS  (PRN):  acetaminophen     Tablet .. 1000 milliGRAM(s) Oral every 6 hours PRN Moderate Pain (4 - 6)  ibuprofen  Tablet. 600 milliGRAM(s) Oral every 6 hours PRN Mild Pain (1 - 3)  ondansetron Injectable 8 milliGRAM(s) IV Push every 6 hours PRN N/V first line      Physical Exam:  Constitutional: NAD  Pulmonary: no increased work of breathing  Cardiovascular: Regular rate and rhythm   Abdomen: incision site indurated and erythematous with 0.5cm to right of midline defect with purulent drainage, probes to 2cm deep, nonpainful   Extremities: no lower extremity edema or calf tenderness. SCDs in place     LABS:                        10.4   14.99 )-----------( 320      ( 29 Jan 2022 08:39 )             32.4     01-29    139  |  106  |  8   ----------------------------<  88  3.5   |  21<L>  |  0.68    Ca    8.7      29 Jan 2022 08:39  Phos  3.1     01-29  Mg     1.7     01-29            RADIOLOGY & ADDITIONAL TESTS:    
GYN Progress Note  POD#19   HD#5    Patient seen and examined at bedside.  No acute events overnight. No acute complaints.  Pain well controlled and improved.   Patient is ambulating and tolerating regular diet.   Patient passing flatus, having bowel movements.   Patient is voiding spontaneously.   Denies CP, SOB, N/V, fevers, and chills overnight.     Vital Signs Last 24 Hours  T(C): 36.8 (01-31-22 @ 04:54), Max: 37.2 (01-30-22 @ 20:13)  HR: 73 (01-31-22 @ 04:54) (73 - 103)  BP: 100/67 (01-31-22 @ 04:54) (100/67 - 116/83)  RR: 17 (01-31-22 @ 04:54) (15 - 17)  SpO2: 96% (01-31-22 @ 04:54) (96% - 98%)    I&O's Summary    30 Jan 2022 07:01  -  31 Jan 2022 07:00  --------------------------------------------------------  IN: 2150 mL / OUT: 2950 mL / NET: -800 mL        Physical Exam:  General: NAD  CV: RR, S1, S2  Lungs: CTA b/l, good air flow b/l   Abdomen: Soft, appropriately tender to palpation across incision; non-distended, present bowel sounds in all 4 quadrants   Incision: small 5mm exposed area with mild purulent / sanguinous fluid; less erythematous, incision with induration  : no bleeding on pad  Ext: warm and well perfused    Labs:                        10.3   8.59  )-----------( 360      ( 31 Jan 2022 04:04 )             33.7   baso 0.6    eos 3.4    imm gran 0.2    lymph 24.7   mono 7.8    poly 63.3                         10.0   9.74  )-----------( 330      ( 30 Jan 2022 09:14 )             32.0   baso 0.5    eos 1.8    imm gran 0.3    lymph 14.3   mono 6.8    poly 76.3                         10.4   14.99 )-----------( 320      ( 29 Jan 2022 08:39 )             32.4   baso 0.3    eos 0.4    imm gran 0.5    lymph 12.5   mono 6.6    poly 79.7       MEDICATIONS  (STANDING):  enoxaparin Injectable 40 milliGRAM(s) SubCutaneous every 24 hours  piperacillin/tazobactam IVPB.. 3.375 Gram(s) IV Intermittent every 6 hours  vancomycin  IVPB 1000 milliGRAM(s) IV Intermittent every 12 hours    MEDICATIONS  (PRN):  acetaminophen     Tablet .. 1000 milliGRAM(s) Oral every 6 hours PRN Moderate Pain (4 - 6)  ibuprofen  Tablet. 600 milliGRAM(s) Oral every 6 hours PRN Mild Pain (1 - 3)  ondansetron Injectable 8 milliGRAM(s) IV Push every 6 hours PRN N/V first line      
INTERVAL HPI/OVERNIGHT EVENTS: No recurrence of fever. Feels much better.    CONSTITUTIONAL:  Negative fever or chills, feels well, good appetite  EYES:  Negative  blurry vision or double vision  CARDIOVASCULAR:  Negative for chest pain or palpitations  RESPIRATORY:  Negative for cough, wheezing, or SOB   GASTROINTESTINAL:  Negative for nausea, vomiting, diarrhea, constipation, or abdominal pain  GENITOURINARY:  Negative frequency, urgency or dysuria  NEUROLOGIC:  No headache, confusion, dizziness, lightheadedness      ANTIBIOTICS/RELEVANT:    MEDICATIONS  (STANDING):  enoxaparin Injectable 40 milliGRAM(s) SubCutaneous every 24 hours  piperacillin/tazobactam IVPB.. 3.375 Gram(s) IV Intermittent every 6 hours  vancomycin  IVPB 1000 milliGRAM(s) IV Intermittent every 12 hours    MEDICATIONS  (PRN):  acetaminophen     Tablet .. 1000 milliGRAM(s) Oral every 6 hours PRN Moderate Pain (4 - 6)  ibuprofen  Tablet. 600 milliGRAM(s) Oral every 6 hours PRN Mild Pain (1 - 3)  ondansetron Injectable 8 milliGRAM(s) IV Push every 6 hours PRN N/V first line        Vital Signs Last 24 Hrs  T(C): 36.7 (31 Jan 2022 12:10), Max: 37.2 (30 Jan 2022 20:13)  T(F): 98.1 (31 Jan 2022 12:10), Max: 98.9 (30 Jan 2022 20:13)  HR: 92 (31 Jan 2022 12:10) (73 - 103)  BP: 90/58 (31 Jan 2022 12:10) (90/58 - 118/82)  BP(mean): --  RR: 15 (31 Jan 2022 12:10) (15 - 17)  SpO2: 97% (31 Jan 2022 12:10) (96% - 98%)    PHYSICAL EXAM:  Constitutional: NAD  Eyes: YENIFER, EOMI  Ear/Nose/Throat: no oral lesion, no sinus tenderness on percussion	  Neck: no JVD, no lymphadenopathy, supple  Respiratory: CTA antonia  Cardiovascular: S1S2 RRR, no murmurs  Gastrointestinal:soft, (+) BS, no HSM  Extremities:no e/e/c  Vascular: DP Pulse:	right normal; left normal      LABS:                        10.3   8.59  )-----------( 360      ( 31 Jan 2022 04:04 )             33.7     01-31    144  |  110<H>  |  5<L>  ----------------------------<  94  4.4   |  22  |  0.75    Ca    8.7      31 Jan 2022 04:04  Phos  3.7     01-31  Mg     2.2     01-31            MICROBIOLOGY: Culture - Surgical Swab (01.30.22 @ 08:01)    Gram Stain:   Few Gram positive cocci in pairs  Moderate White blood cells    Specimen Source: .Surgical Swab Abdominal incision drainage    Culture Results:   Moderate Staphylococcus aureus  Presumptive Methicillin susceptible  Confirmation to follow within 24 hrs.  Susceptibility to follow.        RADIOLOGY & ADDITIONAL STUDIES: reviewed

## 2022-01-31 NOTE — PROGRESS NOTE ADULT - REASON FOR ADMISSION
post op fever at home

## 2022-01-31 NOTE — PROGRESS NOTE ADULT - ASSESSMENT
50yo P2 HD4/POD18 s/p ANGELIC, BS, R cystectomy (hemorrhagic cyst) for large fibroid uterus causing abnormal uterine bleeding with Dr. Stover on 1/12. Admitted for Post op fever, CT suspicious for intra-abdominal abscess.     Neuro: Tylenol, prn  Cards: LR@125 for previously meeting SIRS criteria, possible d/c fluid today  Resp: RA  FEN/GI: diet, PPI, zofran prn  : voiding  ID: intra-abdominal abscess noted on CT A/P. TLast 102 at about 5am 1/29, afebrile since.  blood and urine Clx showing no growth to date. Zosyn 3.375  q4h started 1/27. ID consult 1/29 recommended starting vancomycin 1000mg BID, which pt has received. Also recommended repeat imaging with possible IR drainage of fluid collection if fevers do not improve after 48hrs on vanc. New wound drainage cultured, f/u results (collected 1/30)   PPX: SCDs, lovenox       
52yo P2 HD5/POD19 s/p ANGELIC, BS, R cystectomy (hemorrhagic cyst) for large fibroid uterus causing abnormal uterine bleeding with Dr. Stover on 1/12. Admitted for Post op fever, CT suspicious for intra-abdominal abscess; patient admitted for IV antibiotics, currently responding well. Cultures collected, will f/u ID recommendations and f/u culture results.     Neuro: Tylenol, prn  Cards: heplock, vitals signs stable   Resp: RA  FEN/GI: diet, PPI, zofran prn  : voiding  ID: intra-abdominal abscess noted on CT A/P. TLast 102 at about 5am 1/29, afebrile since.  blood and urine Clx showing no growth to date. Zosyn 3.375  q4h started 1/27. ID consult 1/29 recommended starting vancomycin 1000mg BID along with Zosyn.  Also recommended repeat imaging with possible IR drainage of fluid collection if fevers do not improve after 48hrs on vanc, however patient afebrile with spontaneous drainage of incision- f/u results of culture (collected 1/30); Vanc trough this AM therapeutic (12), check before 4th dose, f/u ID recs 1/31  PPX: SCDs, lovenox     KL PGY3
50yo P2 HD3/POD17 s/p ANGELIC, BS, R cystectomy (hemorrhagic cyst) for large fibroid uterus causing abnormal uterine bleeding with Dr. Stover on 1/12. Admitted for Post op fever, CT suspicious for intra-abdominal abscess.     Neuro: Tylenol, prn  Cards: starting patient on IVH 2/2 tachycardia and fever over night (meeting SIRS criteria)  Resp: RA  FEN/GI: diet, PPI, zofran prn  : voiding  ID: intra-abdominal abscess noted on CT A/P. TLast 102 at about 5am today.  blood and urine Clx showing no growth to date. Zosyn 3.375  q4h started 1/27. Plan to consult ID today.  PPX: SCDs, lovenox       
50 yo female with hx fibroids s/p ANGELIC 1/12/22, now presenting with fever/rigors, found to have 6 X 3 cm rebecca-incisional abdominal wall abscess c/w SSI. Clinically improved. Culture from abdominal wound drainage with presumptive MSSA.  - d/c vancomycin and Zosyn and transition to cephalexin 500mg PO q6h through 2/11/22    Will arrange post hospital f/u for next week to assess for interval resolution    d/w primary team    Please reconsult with ?    ID Team 2  
52yo P2 POD15 s/p ANGELIC, BS, R cystectomy (hemorrhagic cyst) for large fibroid uterus causing abnormal uterine bleeding with Dr. Stover on 1/12. Admitted for Post op fever, CT suspicious for intra-abdominal abscess.    Neuro: Tylenol, prn  Cards: starting patient on IVH 2/2 tachycardia and fever over night (meeting SIRS criteria)  Resp: RA  FEN/GI: diet, PPI, zofran prn  : voiding  ID: intra-abdominal abscess noted on CT A/P. TLast 100.8 at about 1:45am. f/u blood and urine Clx, Zosyn 3.375 (1/27-)  PPX: SCDs, lovenox

## 2022-02-01 LAB
-  CEFAZOLIN: SIGNIFICANT CHANGE UP
-  CLINDAMYCIN: SIGNIFICANT CHANGE UP
-  ERYTHROMYCIN: SIGNIFICANT CHANGE UP
-  LINEZOLID: SIGNIFICANT CHANGE UP
-  OXACILLIN: SIGNIFICANT CHANGE UP
-  RIFAMPIN: SIGNIFICANT CHANGE UP
-  TRIMETHOPRIM/SULFAMETHOXAZOLE: SIGNIFICANT CHANGE UP
-  VANCOMYCIN: SIGNIFICANT CHANGE UP
CULTURE RESULTS: SIGNIFICANT CHANGE UP
METHOD TYPE: SIGNIFICANT CHANGE UP
ORGANISM # SPEC MICROSCOPIC CNT: SIGNIFICANT CHANGE UP
ORGANISM # SPEC MICROSCOPIC CNT: SIGNIFICANT CHANGE UP
SPECIMEN SOURCE: SIGNIFICANT CHANGE UP

## 2022-02-10 ENCOUNTER — APPOINTMENT (OUTPATIENT)
Dept: INFECTIOUS DISEASE | Facility: CLINIC | Age: 52
End: 2022-02-10
Payer: MEDICAID

## 2022-02-10 VITALS
HEIGHT: 62 IN | SYSTOLIC BLOOD PRESSURE: 107 MMHG | BODY MASS INDEX: 28.78 KG/M2 | TEMPERATURE: 97.3 F | HEART RATE: 75 BPM | OXYGEN SATURATION: 99 % | WEIGHT: 156.38 LBS | DIASTOLIC BLOOD PRESSURE: 73 MMHG

## 2022-02-10 DIAGNOSIS — T81.41XA INFECTION FOLLOWING A PROCEDURE,SUPERFICIAL INCISIONAL SURGI SITE,INITIAL ENC: ICD-10-CM

## 2022-02-10 PROCEDURE — 99213 OFFICE O/P EST LOW 20 MIN: CPT

## 2022-02-10 RX ORDER — CEPHALEXIN 500 MG/1
500 CAPSULE ORAL
Refills: 0 | Status: ACTIVE | COMMUNITY

## 2022-02-10 NOTE — HISTORY OF PRESENT ILLNESS
[FreeTextEntry1] : 52 yo female with hx fibroids s/p ANGELIC 1/12/22, presented with fever/rigors, found to have 6 X 3 cm rebecca-incisional abdominal wall abscess c/w SSI. Culture from abdominal wound drainage with presumptive MSSA.She was discharged on PO cephalexin. No recurrence of fever or rigors. She notices scant drainage from pelvic incision which has been decreasing every day. Continues on cephalexin. Endorses constipation--states she is afraid to strain and risk wound dehiscence.

## 2022-02-10 NOTE — PHYSICAL EXAM
[General Appearance - Alert] : alert [General Appearance - In No Acute Distress] : in no acute distress [FreeTextEntry1] : pelvic incision c/d/i; no surrounding cellulitis; no discharge expressed; small amount of serous discharge on dressing [Oriented To Time, Place, And Person] : oriented to person, place, and time [Affect] : the affect was normal

## 2022-02-10 NOTE — ASSESSMENT
[FreeTextEntry1] : 50 yo female with hx fibroids s/p ANGELIC 1/12/22, presented with fever/rigors, found to have 6 X 3 cm rebecca-incisional abdominal wall abscess c/w SSI. Culture from abdominal wound drainage with presumptive MSSA.She was discharged on PO cephalexin. Clinically resolving infection.\par - CBC, CMP, ESR, CRP\par - continue cephalexin anticipated through 2/11/22\par - has f/u with gyn today\par \par rtc prn [Treatment Education] : treatment education [Treatment Adherence] : treatment adherence [Risk Reduction] : risk reduction [Anticipatory Guidance] : anticipatory guidance

## 2022-02-11 LAB
ALBUMIN SERPL ELPH-MCNC: 4.2 G/DL
ALP BLD-CCNC: 59 U/L
ALT SERPL-CCNC: 8 U/L
ANION GAP SERPL CALC-SCNC: 11 MMOL/L
AST SERPL-CCNC: 12 U/L
BASOPHILS # BLD AUTO: 0.03 K/UL
BASOPHILS NFR BLD AUTO: 0.4 %
BILIRUB SERPL-MCNC: 0.2 MG/DL
BUN SERPL-MCNC: 15 MG/DL
CALCIUM SERPL-MCNC: 10.1 MG/DL
CHLORIDE SERPL-SCNC: 103 MMOL/L
CO2 SERPL-SCNC: 26 MMOL/L
CREAT SERPL-MCNC: 0.59 MG/DL
CRP SERPL-MCNC: 4 MG/L
EOSINOPHIL # BLD AUTO: 0.17 K/UL
EOSINOPHIL NFR BLD AUTO: 2.1 %
ERYTHROCYTE [SEDIMENTATION RATE] IN BLOOD BY WESTERGREN METHOD: 28 MM/HR
GLUCOSE SERPL-MCNC: 77 MG/DL
HCT VFR BLD CALC: 41.1 %
HGB BLD-MCNC: 12.1 G/DL
IMM GRANULOCYTES NFR BLD AUTO: 0.1 %
LYMPHOCYTES # BLD AUTO: 1.83 K/UL
LYMPHOCYTES NFR BLD AUTO: 22.1 %
MAN DIFF?: NORMAL
MCHC RBC-ENTMCNC: 26.7 PG
MCHC RBC-ENTMCNC: 29.4 GM/DL
MCV RBC AUTO: 90.7 FL
MONOCYTES # BLD AUTO: 0.46 K/UL
MONOCYTES NFR BLD AUTO: 5.6 %
NEUTROPHILS # BLD AUTO: 5.78 K/UL
NEUTROPHILS NFR BLD AUTO: 69.7 %
PLATELET # BLD AUTO: 356 K/UL
POTASSIUM SERPL-SCNC: 4.5 MMOL/L
PROT SERPL-MCNC: 7.2 G/DL
RBC # BLD: 4.53 M/UL
RBC # FLD: 15.7 %
SODIUM SERPL-SCNC: 139 MMOL/L
WBC # FLD AUTO: 8.28 K/UL

## 2022-02-14 DIAGNOSIS — L02.211 CUTANEOUS ABSCESS OF ABDOMINAL WALL: ICD-10-CM

## 2022-02-14 DIAGNOSIS — Z90.710 ACQUIRED ABSENCE OF BOTH CERVIX AND UTERUS: ICD-10-CM

## 2022-02-14 DIAGNOSIS — Z98.82 BREAST IMPLANT STATUS: ICD-10-CM

## 2022-02-14 DIAGNOSIS — B95.61 METHICILLIN SUSCEPTIBLE STAPHYLOCOCCUS AUREUS INFECTION AS THE CAUSE OF DISEASES CLASSIFIED ELSEWHERE: ICD-10-CM
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
## 2022-02-14 DIAGNOSIS — T81.43XA INFECTION FOLLOWING A PROCEDURE, ORGAN AND SPACE SURGICAL SITE, INITIAL ENCOUNTER: ICD-10-CM

## 2022-02-14 DIAGNOSIS — Z90.721 ACQUIRED ABSENCE OF OVARIES, UNILATERAL: ICD-10-CM

## 2022-02-15 PROCEDURE — C1889: CPT

## 2022-02-15 PROCEDURE — 85027 COMPLETE CBC AUTOMATED: CPT

## 2022-02-15 PROCEDURE — 88302 TISSUE EXAM BY PATHOLOGIST: CPT

## 2022-02-15 PROCEDURE — 86850 RBC ANTIBODY SCREEN: CPT

## 2022-02-15 PROCEDURE — C9399: CPT

## 2022-02-15 PROCEDURE — 88305 TISSUE EXAM BY PATHOLOGIST: CPT

## 2022-02-15 PROCEDURE — 36415 COLL VENOUS BLD VENIPUNCTURE: CPT

## 2022-02-15 PROCEDURE — 82962 GLUCOSE BLOOD TEST: CPT

## 2022-02-15 PROCEDURE — 86900 BLOOD TYPING SEROLOGIC ABO: CPT

## 2022-02-15 PROCEDURE — 88307 TISSUE EXAM BY PATHOLOGIST: CPT

## 2022-02-15 PROCEDURE — 86901 BLOOD TYPING SEROLOGIC RH(D): CPT

## 2024-12-27 NOTE — CONSULT NOTE ADULT - SUBJECTIVE AND OBJECTIVE BOX
Applied
HPI:  Patient is a 51 year old P2 who is POD 15 s/p  ANGELIC, BS, R cystectomy (hemorrhagic cyst) for large fibroid uterus causing abnormal uterine bleeding with Dr. Stover on . Patient had an unremarkable post op course while in house and was discharged POD2 after meeting all postop milestones. Starting yesterday patient reported a diffuse HA rated 10/10 for which she took tylenol and motrin, with minimal improvement. HA now rated 7/10. She also experienced subjective fevers and chills, and took her temp at home which was 104 deg F at 3pm. Denies any coughs, SOB, dysuria, urinary frequency or urgency, abdominal pain, calf pain, lightheadedness/dizziness, CP, abnormal vaginal discharge or bleeding.    POB:  c/s x2 ('97, '00)    PGynHx: AUB, fibroid uterus s/p ANGELIC, BS, right cystectomy as per HPI    PMH: denies    PSH: c/s x2; abdominoplasty ; breast augmentation , ANGELIC, BS, right cystectomy 21    Meds: tylenol and ibuprofen prn for post op pain    All: No Known Allergies    Soc: neg x 3    Vital Signs Last 24 Hrs  T(C): 38.2 (2022 01:11), Max: 38.2 (2022 01:11)  T(F): 100.8 (2022 01:11), Max: 100.8 (2022 01:11)  HR: 99 (2022 01:11) (99 - 113)  BP: 125/81 (2022 00:41) (109/72 - 125/81)  BP(mean): --  RR: 16 (2022 01:11) (16 - 18)  SpO2: 96% (2022 01:11) (95% - 98%)    Physical Exam:  Gen: NAD  GI: soft, nontender, nondistended + BS, no rebound no guarding. Pfannenstiel skin incision noted to be erythematous with induration in the midline, minimal fluctuance towards the mons, no pain to palpation, intact without drainage.  Ext: no edema, erythema, tenderness     LABS:                        12.8   19.29 )-----------( 408      ( 2022 18:43 )             40.6         140  |  103  |  12  ----------------------------<  101<H>  4.2   |  25  |  0.73    Ca    10.0      2022 18:43    TPro  8.0  /  Alb  4.2  /  TBili  0.6  /  DBili  x   /  AST  15  /  ALT  15  /  AlkPhos  63        Urinalysis Basic - ( 2022 18:36 )    Color: Yellow / Appearance: Clear / S.020 / pH: x  Gluc: x / Ketone: Trace mg/dL  / Bili: Negative / Urobili: 0.2 E.U./dL   Blood: x / Protein: Trace mg/dL / Nitrite: NEGATIVE   Leuk Esterase: NEGATIVE / RBC: < 5 /HPF / WBC < 5 /HPF   Sq Epi: x / Non Sq Epi: 0-5 /HPF / Bacteria: Present /HPF        RADIOLOGY & ADDITIONAL STUDIES:    ACC: [id number] EXAM:  CT ABDOMEN AND PELVIS IC                          PROCEDURE DATE:  2022          INTERPRETATION:  CT SCAN OF ABDOMEN AND PELVIS    History: Fever, recent hysterectomy, bilateral salpingectomy, left   ovarian cystectomy. Rule out collection/abscess.    Technique: CT scan of abdomen and pelvis was performed from lung bases   through symphysis pubis. Intravenous contrast material was utilized. Oral   contrast not utilized. Axial, sagittal and coronal reformatted images   were reviewed.    Comparison: None.    Findings:    Lower chest: Mild centrilobular emphysematous changes. Linear atelectasis   bilateral lung bases. Partially visualized bilateral breast implants.    Liver:  Normal.    Gallbladder: No radiopaque stones gallbladder.    Spleen:  Normal.    Pancreas:  Normal.    Adrenal glands:  Normal.    Kidneys: No renal stones or hydronephrosis bilaterally.    Adenopathy:  Mildly enlarged bilateral external iliac nodes, measuring up   to 1.2 cm on the right. These are probably reactive.    Ascites: None.    Gastrointestinal tract/peritoneum: Limited evaluation without the use of   enteric contrast. Normal appendix. Large stool throughout the colon. No   obstruction or free air.    Vessels: Normal.    Pelvicorgans: Post supracervical hysterectomy. Normal bladder. No large   adnexal masses. Trace pelvic fluid.    Soft tissues: Extensive subcutaneous fat stranding of the lower abdominal   wall, likely post surgical. There is a 5.7 x 0.9 x 3.1 cm (CC x AP x TR)   partially organized fluid at the level of the incision site between the   abdominal wall fascia and inferior rectus abdominis musculature with   subtle rim enhancement.. Post abdominoplasty. Punctate foci subcutaneous   air left abdominal wall.    Bones: Mild degenerative changes of the spine.    Impression:  1.  Post supracervical hysterectomy. No discrete abdominal or pelvic   fluid collection.  2.  5.7 cm partially organized rim-enhancing fluid at the level of the   incision site between the lower rectus abdominis musculature and   abdominal wall fascia, probable for phlegmon versus early abscess.    I, Adin Banerjee MD, have reviewed the images and the report and agree   with the findings, with the following modification: Also noted is a small   focus of pneumoperitoneum in the pelvis on the left adjacent to the   sigmoid colon most likely related to recent surgery.    --- End of Report ---          ANDRA ZAMORA MD; Resident Radiologist  This document has been electronically signed.  ADIN BANERJEE MD; Attending Radiologist  This document has been electronically signed. 2022  9:34PM     (2022 01:34)      PAST MEDICAL & SURGICAL HISTORY:  Heavy menses    Anemia     delivery delivered  x 2    H/O bilateral breast implants    H/O abdominoplasty    Elective surgery  right foot          REVIEW OF SYSTEMS:    General:	 no weakness; no fevers, no chills  Skin/Breast: no rash  Respiratory and Thorax: no SOB, no cough  Cardiovascular:	No chest pain  Gastrointestinal:	 no nausea, vomiting , diarrhea  Genitourinary:	no dysuria, no difficulty urinating, no hematuria  Musculoskeletal:	no weakness, no joint swelling/pain  Neurological:	no focal weakness/numbness  Endocrine:	no polyuria, no polydipsia      ANTIBIOTICS:  MEDICATIONS  (STANDING):  enoxaparin Injectable 40 milliGRAM(s) SubCutaneous every 24 hours  lactated ringers. 1000 milliLiter(s) (125 mL/Hr) IV Continuous <Continuous>  piperacillin/tazobactam IVPB.. 3.375 Gram(s) IV Intermittent every 6 hours  vancomycin  IVPB 1000 milliGRAM(s) IV Intermittent every 12 hours    MEDICATIONS  (PRN):  acetaminophen     Tablet .. 1000 milliGRAM(s) Oral every 6 hours PRN Moderate Pain (4 - 6)  ibuprofen  Tablet. 600 milliGRAM(s) Oral every 6 hours PRN Mild Pain (1 - 3)  ondansetron Injectable 8 milliGRAM(s) IV Push every 6 hours PRN N/V first line      Allergies    No Known Allergies    Intolerances        SOCIAL HISTORY:    FAMILY HISTORY:      Vital Signs Last 24 Hrs  T(C): 36.8 (2022 12:54), Max: 39.2 (2022 05:00)  T(F): 98.3 (2022 12:54), Max: 102.6 (2022 05:00)  HR: 89 (2022 12:54) (87 - 108)  BP: 111/70 (2022 12:54) (95/64 - 121/77)  BP(mean): 85 (2022 05:00) (85 - 85)  RR: 17 (2022 12:54) (15 - 17)  SpO2: 95% (2022 12:54) (93% - 99%)    PHYSICAL EXAM:  Constitutional: NAD  Eyes: YENIFER, EOMI  Ear/Nose/Throat: no oral lesion, no sinus tenderness on percussion	  Neck: no JVD, no lymphadenopathy, supple  Respiratory: CTA antonia  Cardiovascular: S1S2 RRR, no murmurs  Gastrointestinal: soft, (+) BS, no HSM  Extremities:no e/e/c  Vascular: DP Pulse: right normal; left normal            LABS:                        10.4   14.99 )-----------( 320      ( 2022 08:39 )             32.4     01-    139  |  106  |  8   ----------------------------<  88  3.5   |  21<L>  |  0.68    Ca    8.7      2022 08:39  Phos  3.1     -  Mg     1.7     -    TPro  8.0  /  Alb  4.2  /  TBili  0.6  /  DBili  x   /  AST  15  /  ALT  15  /  AlkPhos  63  01-27      Urinalysis Basic - ( 2022 18:36 )    Color: Yellow / Appearance: Clear / S.020 / pH: x  Gluc: x / Ketone: Trace mg/dL  / Bili: Negative / Urobili: 0.2 E.U./dL   Blood: x / Protein: Trace mg/dL / Nitrite: NEGATIVE   Leuk Esterase: NEGATIVE / RBC: < 5 /HPF / WBC < 5 /HPF   Sq Epi: x / Non Sq Epi: 0-5 /HPF / Bacteria: Present /HPF        MICROBIOLOGY: reviewed  RADIOLOGY & ADDITIONAL STUDIES: reviewed

## 2025-01-28 NOTE — PATIENT PROFILE ADULT - NSPROPOAPRESSUREINJURY_GEN_A_NUR
